# Patient Record
Sex: FEMALE | Race: WHITE | NOT HISPANIC OR LATINO | Employment: FULL TIME
[De-identification: names, ages, dates, MRNs, and addresses within clinical notes are randomized per-mention and may not be internally consistent; named-entity substitution may affect disease eponyms.]

---

## 2017-07-01 ENCOUNTER — HEALTH MAINTENANCE LETTER (OUTPATIENT)
Age: 42
End: 2017-07-01

## 2019-10-04 ENCOUNTER — HEALTH MAINTENANCE LETTER (OUTPATIENT)
Age: 44
End: 2019-10-04

## 2020-11-08 ENCOUNTER — HEALTH MAINTENANCE LETTER (OUTPATIENT)
Age: 45
End: 2020-11-08

## 2021-01-31 ENCOUNTER — HEALTH MAINTENANCE LETTER (OUTPATIENT)
Age: 46
End: 2021-01-31

## 2021-09-11 ENCOUNTER — HEALTH MAINTENANCE LETTER (OUTPATIENT)
Age: 46
End: 2021-09-11

## 2022-01-01 ENCOUNTER — HEALTH MAINTENANCE LETTER (OUTPATIENT)
Age: 47
End: 2022-01-01

## 2022-02-26 ENCOUNTER — HEALTH MAINTENANCE LETTER (OUTPATIENT)
Age: 47
End: 2022-02-26

## 2022-10-30 ENCOUNTER — HEALTH MAINTENANCE LETTER (OUTPATIENT)
Age: 47
End: 2022-10-30

## 2023-04-08 ENCOUNTER — HEALTH MAINTENANCE LETTER (OUTPATIENT)
Age: 48
End: 2023-04-08

## 2024-01-11 ENCOUNTER — OFFICE VISIT (OUTPATIENT)
Dept: SURGERY | Facility: CLINIC | Age: 49
End: 2024-01-11
Payer: COMMERCIAL

## 2024-01-11 VITALS
SYSTOLIC BLOOD PRESSURE: 140 MMHG | HEART RATE: 90 BPM | DIASTOLIC BLOOD PRESSURE: 78 MMHG | WEIGHT: 150 LBS | HEIGHT: 66 IN | BODY MASS INDEX: 24.11 KG/M2

## 2024-01-11 DIAGNOSIS — K43.2 INCISIONAL HERNIA, WITHOUT OBSTRUCTION OR GANGRENE: Primary | ICD-10-CM

## 2024-01-11 PROCEDURE — 99204 OFFICE O/P NEW MOD 45 MIN: CPT | Performed by: SURGERY

## 2024-01-11 RX ORDER — LEVOTHYROXINE SODIUM 175 UG/1
175 TABLET ORAL DAILY
COMMUNITY

## 2024-01-15 ENCOUNTER — TELEPHONE (OUTPATIENT)
Dept: SURGERY | Facility: CLINIC | Age: 49
End: 2024-01-15
Payer: COMMERCIAL

## 2024-01-15 NOTE — TELEPHONE ENCOUNTER
Type of surgery: Abdominal wall reconstruction and open incisional hernia repair  Location of surgery: [facility]  Date and time of surgery: 1/31/24 at 7:30am  Surgeon: Dr. Leonardo Diggs  Pre-Op Appt Date: patient to schedule  Post-Op Appt Date: patient to schedule   Packet sent out: Yes  Pre-cert/Authorization completed:  Not Applicable  Date: 1/15/24

## 2024-01-22 ENCOUNTER — TELEPHONE (OUTPATIENT)
Dept: SURGERY | Facility: CLINIC | Age: 49
End: 2024-01-22
Payer: COMMERCIAL

## 2024-01-22 NOTE — TELEPHONE ENCOUNTER
Name of caller: Patient    Reason for Call:  Medication Question  Patient has issues with BM's. She takes a medication: Linzess to help with the issue. Wondering if she can take this medication prior to her surgery?    Surgeon:  Dr. Diggs     Recent Surgery:  No    If yes, when & what type:  scheduled 1/31/24    ABDOMINAL WALL RECONSTRUCTION AND OPEN INCISIONAL HERNIA REPAIR       Best phone number to reach pt at is: [phone]  Ok to leave a message with medical info? Yes.    .

## 2024-01-22 NOTE — TELEPHONE ENCOUNTER
Planned Procedure: abdominal wall reconstruction, open incisional hernia repair    Date: 01/31/2024    Surgeon: Dontae    Patient takes linzess. Wondering if this is okay to take up until surgery? Informed her that the only medications our providers recommend be held are blood thinners and some immune suppressant medications. She should discuss this with the provider who will be evaluating her/giving her surgical clearance as sometimes PCP's want additional medications held. She agreed    She reports that she will need to be sent home with some type of bowel regimen after surgery (hx of bowel surgery, which seems to have resulted in issues with constipation/bowel habits). Informed her that she will be sent home with sennakot to use while on narcotics. Can also use miralax, linzess, or other medication regimen that she currently adheres to.    Patient is wondering if there is anything else she needs to be aware of prior to surgery? Informed her that someone from the OR staff will call her the day before surgery to review everything on their end of things.    Patient is wondering if they could call her a couple days before, instead? She reports she has a couple appointments the day before and does not want to miss their call    Informed her that I will send a message to their team, but that I can not guarantee anything    She verbalizes understanding of this and will call BIPIN Joyce RN-BSN

## 2024-01-25 NOTE — PROGRESS NOTES
"[facility]  Surgery Consultation    CONSULTATION REQUESTED BY: Christos Ye MD    HPI: Patient is a very pleasant 49-year-old female referred by the above-mentioned provider for consultation regarding diastases recti.  She has a prior surgical history of cholecystectomy and urgent colon resection for cecal volvulus.  She has noted the development of bulging in her central abdomen that seems to be worsening.  No signs or symptoms of incarceration or strangulation.  No GI or bowel obstruction symptoms.  She also has a multitude of other musculoskeletal issues including chronic core insufficiency and chronic low back pain.    PMH:   has a past medical history of Anxiety state, unspecified (8/3/2005), Endometriosis of other specified sites, Graves Disease (10/21/2005), and Irritable bowel syndrome (10/12/2005).  PSH:    has a past surgical history that includes UPPER GI ENDOSCOPY,EXAM; cholecystectomy, laporoscopic; and surgical history of - .  Social History:   reports that she has never smoked. She has never used smokeless tobacco. She reports current alcohol use. She reports that she does not use drugs.  Family History:  family history includes Coronary Artery Disease in her father and mother; Diabetes in her mother; Hypertension in her father.  Medications/Allergies: Home medications and allergies reviewed.    ROS:  The 10 point Review of Systems is negative other than noted in the HPI.    Physical Exam:  BP (!) 140/78   Pulse 90   Ht 1.664 m (5' 5.5\")   Wt 68 kg (150 lb)   BMI 24.58 kg/m    GENERAL: Generally appears well.  Psych: Alert and Oriented.  Normal affect  Eyes: Sclera clear  Respiratory:  Lungs clear to ausculation bilaterally with good air excursion  Cardiovascular:  Regular Rate and Rhythm with no murmurs gallops or rubs, normal peripheral pulses  GI: Abdomen Non Distended Soft Mild tenderness to palpation incision Incisional hernia palpated..  Lymphatic/Hematologic/Immune:  No " femoral or cervical lymphadenopathy.  Integumentary:  No rashes  Neurological: grossly intact     All new lab and imaging data was reviewed.     Impression and Plan:  Patient is a 49 year old female with incisional hernia in the setting of underlying diastases recti.    PLAN: I had a lengthy conversation regarding management options.  We also discussed different surgical approaches.  I think ultimately she would likely be best served by abdominal wall reconstruction to rectify normalize anatomy including the diastases and the hernia.  This procedure was described in detail.  She is going to take this into consideration and was encouraged to call back to schedule at her convenience.  I discussed the pathophysiology of hernias and options for repair including laparoscopic VS open.  The risks associated with the procedure including, but not limited to, recurrence, nerve entrapment or injury, persistence of pain, injury to the bowel/bladder, infertility, hematoma, mesh migration, mesh infection, MI, and PE were discussed with the patient. She indicated understanding of the discussion, asked appropriate questions, and provided consent. Signs and symptoms of incarceration were discussed. If these develop in the interim, she promises to call or go straight to the ER. I have provided the patient with an information pamphlet.  Thank you very much for this consult.    Leonardo Diggs M.D.  [facility]  [phone]    Please route or send letter to:  Primary Care Provider (PCP) and Referring Provider

## 2024-01-29 ENCOUNTER — TELEPHONE (OUTPATIENT)
Dept: SURGERY | Facility: CLINIC | Age: 49
End: 2024-01-29
Payer: COMMERCIAL

## 2024-01-29 RX ORDER — MOMETASONE FUROATE 1 MG/G
OINTMENT TOPICAL 2 TIMES DAILY PRN
COMMUNITY
End: 2024-05-09

## 2024-01-29 RX ORDER — VITAMIN B COMPLEX
2 TABLET ORAL DAILY
COMMUNITY
End: 2024-01-30

## 2024-01-29 RX ORDER — SUMATRIPTAN 100 MG/1
100 TABLET, FILM COATED ORAL
COMMUNITY

## 2024-01-29 RX ORDER — ONDANSETRON 4 MG/1
4 TABLET, FILM COATED ORAL EVERY 8 HOURS PRN
COMMUNITY

## 2024-01-29 NOTE — TELEPHONE ENCOUNTER
Name of caller: Patient    Reason for Call:  patient has some questions about her upcoming surgery she would like to discuss with a nurse.  Wants to know how long surgery takes, is she really outpatient? Same day surgery?    Surgeon:  Leonardo Diggs MD    Recent Surgery:  No    If yes, when & what type:  Scheduled 1/31/24    Abdominal wall reconstruction   open incisional hernia repair     Best phone number to reach pt at is: [phone]  Ok to leave a message with medical info? Yes.

## 2024-01-29 NOTE — TELEPHONE ENCOUNTER
"Called pt to inform her that she will be \"outpatient in a bed\" and will be observed overnight. Surgery scheduled for 3.5 hours.   We discussed showering regimen and not using any creams/lotions/make up afterwards prior to surgery.   Pt verbalized understanding. No further questions or concerns.  Shanna Delgado RN on 1/29/2024 at 1:07 PM    "

## 2024-01-30 RX ORDER — NORETHINDRONE ACETATE AND ETHINYL ESTRADIOL .03; 1.5 MG/1; MG/1
1 TABLET ORAL DAILY
COMMUNITY

## 2024-01-30 NOTE — PROGRESS NOTES
PTA medications updated by Medication Scribe prior to surgery via phone call with patient (last doses completed by Nurse)     Medication history sources: Patient and H&P  In the past week, patient estimated taking medication this percent of the time: Greater than 90%      Significant changes made to the medication list:  Patient reports no longer taking the following meds (med scribe removed from PTA med list): Microgestin 1/20, Vitamin D3      Additional medication history information:   None    Medication reconciliation completed by provider prior to medication history? No    Time spent in this activity: 28 minutes    The information provided in this note is only as accurate as the sources available at the time of update(s)      Prior to Admission medications    Medication Sig Last Dose Taking? Auth Provider Long Term End Date   doxylamine (UNISOM) 25 MG TABS tablet Take 25 mg by mouth at bedtime  at pm Yes Reported, Patient     levothyroxine (SYNTHROID/LEVOTHROID) 175 MCG tablet Take 175 mcg by mouth daily  at am Yes Reported, Patient Yes    linaclotide (LINZESS) 145 MCG capsule Take 145 mcg by mouth every morning (before breakfast) 1/30/2024 at am Yes Reported, Patient     mometasone (ELOCON) 0.1 % external ointment Apply topically 2 times daily as needed Unknown at prn Yes Reported, Patient     norethindrone-ethinyl estradiol (MICROGESTIN 1.5/30) 1.5-30 MG-MCG tablet Take 1 tablet by mouth daily  at am Yes Reported, Patient     ondansetron (ZOFRAN) 4 MG tablet Take 4 mg by mouth every 8 hours as needed for nausea Unknown at prn Yes Reported, Patient     SUMAtriptan (IMITREX) 100 MG tablet Take 100 mg by mouth at onset of headache for migraine Unknown at prn Yes Reported, Patient         Medication history completed by: Oumou Car LPN

## 2024-01-31 ENCOUNTER — HOSPITAL ENCOUNTER (OUTPATIENT)
Facility: CLINIC | Age: 49
Discharge: HOME | End: 2024-02-03
Attending: SURGERY | Admitting: SURGERY
Payer: COMMERCIAL

## 2024-01-31 ENCOUNTER — ANESTHESIA EVENT (OUTPATIENT)
Dept: SURGERY | Facility: CLINIC | Age: 49
End: 2024-01-31
Payer: COMMERCIAL

## 2024-01-31 ENCOUNTER — ANESTHESIA (OUTPATIENT)
Dept: SURGERY | Facility: CLINIC | Age: 49
End: 2024-01-31
Payer: COMMERCIAL

## 2024-01-31 DIAGNOSIS — K43.2 INCISIONAL HERNIA, WITHOUT OBSTRUCTION OR GANGRENE: Primary | ICD-10-CM

## 2024-01-31 LAB — HCG UR QL: NEGATIVE

## 2024-01-31 PROCEDURE — 258N000003 HC RX IP 258 OP 636: Performed by: PHYSICIAN ASSISTANT

## 2024-01-31 PROCEDURE — 250N000009 HC RX 250: Performed by: ANESTHESIOLOGY

## 2024-01-31 PROCEDURE — 710N000009 HC RECOVERY PHASE 1, LEVEL 1, PER MIN: Performed by: SURGERY

## 2024-01-31 PROCEDURE — 250N000011 HC RX IP 250 OP 636: Performed by: ANESTHESIOLOGY

## 2024-01-31 PROCEDURE — 250N000011 HC RX IP 250 OP 636: Performed by: SURGERY

## 2024-01-31 PROCEDURE — 360N000076 HC SURGERY LEVEL 3, PER MIN: Performed by: SURGERY

## 2024-01-31 PROCEDURE — 272N000001 HC OR GENERAL SUPPLY STERILE: Performed by: SURGERY

## 2024-01-31 PROCEDURE — 250N000011 HC RX IP 250 OP 636: Performed by: PHYSICIAN ASSISTANT

## 2024-01-31 PROCEDURE — 15734 MUSCLE-SKIN GRAFT TRUNK: CPT | Performed by: SURGERY

## 2024-01-31 PROCEDURE — C1781 MESH (IMPLANTABLE): HCPCS | Performed by: SURGERY

## 2024-01-31 PROCEDURE — 250N000013 HC RX MED GY IP 250 OP 250 PS 637: Performed by: SURGERY

## 2024-01-31 PROCEDURE — 49591 RPR AA HRN 1ST < 3 CM RDC: CPT | Mod: 51 | Performed by: SURGERY

## 2024-01-31 PROCEDURE — 250N000013 HC RX MED GY IP 250 OP 250 PS 637: Performed by: ANESTHESIOLOGY

## 2024-01-31 PROCEDURE — 258N000003 HC RX IP 258 OP 636: Performed by: ANESTHESIOLOGY

## 2024-01-31 PROCEDURE — 49591 RPR AA HRN 1ST < 3 CM RDC: CPT | Mod: 51 | Performed by: PHYSICIAN ASSISTANT

## 2024-01-31 PROCEDURE — 999N000141 HC STATISTIC PRE-PROCEDURE NURSING ASSESSMENT: Performed by: SURGERY

## 2024-01-31 PROCEDURE — 250N000009 HC RX 250: Performed by: SURGERY

## 2024-01-31 PROCEDURE — 370N000017 HC ANESTHESIA TECHNICAL FEE, PER MIN: Performed by: SURGERY

## 2024-01-31 PROCEDURE — 15734 MUSCLE-SKIN GRAFT TRUNK: CPT | Mod: 51 | Performed by: PHYSICIAN ASSISTANT

## 2024-01-31 PROCEDURE — 81025 URINE PREGNANCY TEST: CPT | Performed by: ANESTHESIOLOGY

## 2024-01-31 PROCEDURE — 250N000013 HC RX MED GY IP 250 OP 250 PS 637: Performed by: PHYSICIAN ASSISTANT

## 2024-01-31 DEVICE — COMPOSITE MESH,MONOFILAMENT POLYESTER WITH ABSORBABLE COLLAGEN FILM AND MARKING
Type: IMPLANTABLE DEVICE | Site: ABDOMEN | Status: FUNCTIONAL
Brand: SYMBOTEX

## 2024-01-31 RX ORDER — ONDANSETRON 4 MG/1
4 TABLET, ORALLY DISINTEGRATING ORAL EVERY 6 HOURS PRN
Status: DISCONTINUED | OUTPATIENT
Start: 2024-01-31 | End: 2024-01-31

## 2024-01-31 RX ORDER — KETOROLAC TROMETHAMINE 30 MG/ML
30 INJECTION, SOLUTION INTRAMUSCULAR; INTRAVENOUS ONCE
Status: COMPLETED | OUTPATIENT
Start: 2024-01-31 | End: 2024-01-31

## 2024-01-31 RX ORDER — LIDOCAINE 40 MG/G
CREAM TOPICAL
Status: DISCONTINUED | OUTPATIENT
Start: 2024-01-31 | End: 2024-02-03 | Stop reason: HOSPADM

## 2024-01-31 RX ORDER — PROPOFOL 10 MG/ML
INJECTION, EMULSION INTRAVENOUS CONTINUOUS PRN
Status: DISCONTINUED | OUTPATIENT
Start: 2024-01-31 | End: 2024-01-31

## 2024-01-31 RX ORDER — LIDOCAINE HYDROCHLORIDE 20 MG/ML
INJECTION, SOLUTION INFILTRATION; PERINEURAL PRN
Status: DISCONTINUED | OUTPATIENT
Start: 2024-01-31 | End: 2024-01-31

## 2024-01-31 RX ORDER — SODIUM CHLORIDE, SODIUM LACTATE, POTASSIUM CHLORIDE, CALCIUM CHLORIDE 600; 310; 30; 20 MG/100ML; MG/100ML; MG/100ML; MG/100ML
INJECTION, SOLUTION INTRAVENOUS CONTINUOUS
Status: DISCONTINUED | OUTPATIENT
Start: 2024-01-31 | End: 2024-02-03 | Stop reason: HOSPADM

## 2024-01-31 RX ORDER — PROPOFOL 10 MG/ML
INJECTION, EMULSION INTRAVENOUS PRN
Status: DISCONTINUED | OUTPATIENT
Start: 2024-01-31 | End: 2024-01-31

## 2024-01-31 RX ORDER — KETOROLAC TROMETHAMINE 15 MG/ML
15 INJECTION, SOLUTION INTRAMUSCULAR; INTRAVENOUS EVERY 6 HOURS
Qty: 4 ML | Refills: 0 | Status: COMPLETED | OUTPATIENT
Start: 2024-01-31 | End: 2024-02-01

## 2024-01-31 RX ORDER — METHOCARBAMOL 500 MG/1
500 TABLET, FILM COATED ORAL 4 TIMES DAILY
Status: DISCONTINUED | OUTPATIENT
Start: 2024-01-31 | End: 2024-02-03 | Stop reason: HOSPADM

## 2024-01-31 RX ORDER — ONDANSETRON 4 MG/1
4 TABLET, ORALLY DISINTEGRATING ORAL EVERY 30 MIN PRN
Status: DISCONTINUED | OUTPATIENT
Start: 2024-01-31 | End: 2024-01-31

## 2024-01-31 RX ORDER — FENTANYL CITRATE 50 UG/ML
INJECTION, SOLUTION INTRAMUSCULAR; INTRAVENOUS PRN
Status: DISCONTINUED | OUTPATIENT
Start: 2024-01-31 | End: 2024-01-31

## 2024-01-31 RX ORDER — SCOLOPAMINE TRANSDERMAL SYSTEM 1 MG/1
1 PATCH, EXTENDED RELEASE TRANSDERMAL ONCE
Status: COMPLETED | OUTPATIENT
Start: 2024-01-31 | End: 2024-02-01

## 2024-01-31 RX ORDER — ONDANSETRON 2 MG/ML
4 INJECTION INTRAMUSCULAR; INTRAVENOUS EVERY 6 HOURS PRN
Status: DISCONTINUED | OUTPATIENT
Start: 2024-01-31 | End: 2024-01-31

## 2024-01-31 RX ORDER — HYDROMORPHONE HCL IN WATER/PF 6 MG/30 ML
0.4 PATIENT CONTROLLED ANALGESIA SYRINGE INTRAVENOUS
Status: DISCONTINUED | OUTPATIENT
Start: 2024-01-31 | End: 2024-02-03 | Stop reason: HOSPADM

## 2024-01-31 RX ORDER — PROCHLORPERAZINE MALEATE 10 MG
10 TABLET ORAL EVERY 6 HOURS PRN
Status: DISCONTINUED | OUTPATIENT
Start: 2024-01-31 | End: 2024-02-03 | Stop reason: HOSPADM

## 2024-01-31 RX ORDER — OXYCODONE HYDROCHLORIDE 5 MG/1
5 TABLET ORAL EVERY 4 HOURS PRN
Status: DISCONTINUED | OUTPATIENT
Start: 2024-01-31 | End: 2024-02-03 | Stop reason: HOSPADM

## 2024-01-31 RX ORDER — HYDROMORPHONE HCL IN WATER/PF 6 MG/30 ML
0.2 PATIENT CONTROLLED ANALGESIA SYRINGE INTRAVENOUS EVERY 5 MIN PRN
Status: DISCONTINUED | OUTPATIENT
Start: 2024-01-31 | End: 2024-01-31

## 2024-01-31 RX ORDER — CEFAZOLIN SODIUM/WATER 2 G/20 ML
2 SYRINGE (ML) INTRAVENOUS SEE ADMIN INSTRUCTIONS
Status: DISCONTINUED | OUTPATIENT
Start: 2024-01-31 | End: 2024-01-31 | Stop reason: HOSPADM

## 2024-01-31 RX ORDER — SODIUM CHLORIDE, SODIUM LACTATE, POTASSIUM CHLORIDE, CALCIUM CHLORIDE 600; 310; 30; 20 MG/100ML; MG/100ML; MG/100ML; MG/100ML
INJECTION, SOLUTION INTRAVENOUS CONTINUOUS PRN
Status: DISCONTINUED | OUTPATIENT
Start: 2024-01-31 | End: 2024-01-31

## 2024-01-31 RX ORDER — NALOXONE HYDROCHLORIDE 0.4 MG/ML
0.4 INJECTION, SOLUTION INTRAMUSCULAR; INTRAVENOUS; SUBCUTANEOUS
Status: DISCONTINUED | OUTPATIENT
Start: 2024-01-31 | End: 2024-02-03 | Stop reason: HOSPADM

## 2024-01-31 RX ORDER — AMOXICILLIN 250 MG
1 CAPSULE ORAL 2 TIMES DAILY
Status: DISCONTINUED | OUTPATIENT
Start: 2024-01-31 | End: 2024-02-03 | Stop reason: HOSPADM

## 2024-01-31 RX ORDER — NALOXONE HYDROCHLORIDE 0.4 MG/ML
0.2 INJECTION, SOLUTION INTRAMUSCULAR; INTRAVENOUS; SUBCUTANEOUS
Status: DISCONTINUED | OUTPATIENT
Start: 2024-01-31 | End: 2024-02-03 | Stop reason: HOSPADM

## 2024-01-31 RX ORDER — OXYCODONE HYDROCHLORIDE 5 MG/1
10 TABLET ORAL EVERY 4 HOURS PRN
Status: DISCONTINUED | OUTPATIENT
Start: 2024-01-31 | End: 2024-02-03 | Stop reason: HOSPADM

## 2024-01-31 RX ORDER — SODIUM CHLORIDE, SODIUM LACTATE, POTASSIUM CHLORIDE, CALCIUM CHLORIDE 600; 310; 30; 20 MG/100ML; MG/100ML; MG/100ML; MG/100ML
INJECTION, SOLUTION INTRAVENOUS CONTINUOUS
Status: DISCONTINUED | OUTPATIENT
Start: 2024-01-31 | End: 2024-01-31

## 2024-01-31 RX ORDER — ONDANSETRON 2 MG/ML
4 INJECTION INTRAMUSCULAR; INTRAVENOUS EVERY 30 MIN PRN
Status: DISCONTINUED | OUTPATIENT
Start: 2024-01-31 | End: 2024-01-31

## 2024-01-31 RX ORDER — HYDROMORPHONE HCL IN WATER/PF 6 MG/30 ML
0.2 PATIENT CONTROLLED ANALGESIA SYRINGE INTRAVENOUS
Status: DISCONTINUED | OUTPATIENT
Start: 2024-01-31 | End: 2024-02-03 | Stop reason: HOSPADM

## 2024-01-31 RX ORDER — DEXMEDETOMIDINE HYDROCHLORIDE 4 UG/ML
INJECTION, SOLUTION INTRAVENOUS CONTINUOUS PRN
Status: DISCONTINUED | OUTPATIENT
Start: 2024-01-31 | End: 2024-01-31

## 2024-01-31 RX ORDER — DEXAMETHASONE SODIUM PHOSPHATE 4 MG/ML
INJECTION, SOLUTION INTRA-ARTICULAR; INTRALESIONAL; INTRAMUSCULAR; INTRAVENOUS; SOFT TISSUE PRN
Status: DISCONTINUED | OUTPATIENT
Start: 2024-01-31 | End: 2024-01-31

## 2024-01-31 RX ORDER — SUMATRIPTAN 50 MG/1
100 TABLET, FILM COATED ORAL
Status: DISCONTINUED | OUTPATIENT
Start: 2024-01-31 | End: 2024-02-03 | Stop reason: HOSPADM

## 2024-01-31 RX ORDER — KETAMINE HYDROCHLORIDE 10 MG/ML
INJECTION INTRAMUSCULAR; INTRAVENOUS PRN
Status: DISCONTINUED | OUTPATIENT
Start: 2024-01-31 | End: 2024-01-31

## 2024-01-31 RX ORDER — NORETHINDRONE ACETATE AND ETHINYL ESTRADIOL .03; 1.5 MG/1; MG/1
1 TABLET ORAL DAILY
Status: DISCONTINUED | OUTPATIENT
Start: 2024-02-01 | End: 2024-02-03 | Stop reason: HOSPADM

## 2024-01-31 RX ORDER — MAGNESIUM HYDROXIDE 1200 MG/15ML
LIQUID ORAL PRN
Status: DISCONTINUED | OUTPATIENT
Start: 2024-01-31 | End: 2024-01-31 | Stop reason: HOSPADM

## 2024-01-31 RX ORDER — ONDANSETRON 4 MG/1
4 TABLET, FILM COATED ORAL EVERY 8 HOURS PRN
Status: DISCONTINUED | OUTPATIENT
Start: 2024-01-31 | End: 2024-01-31

## 2024-01-31 RX ORDER — CEFAZOLIN SODIUM/WATER 2 G/20 ML
2 SYRINGE (ML) INTRAVENOUS
Status: COMPLETED | OUTPATIENT
Start: 2024-01-31 | End: 2024-01-31

## 2024-01-31 RX ORDER — ACETAMINOPHEN 325 MG/1
975 TABLET ORAL ONCE
Status: COMPLETED | OUTPATIENT
Start: 2024-01-31 | End: 2024-01-31

## 2024-01-31 RX ORDER — NEOSTIGMINE METHYLSULFATE 1 MG/ML
VIAL (ML) INJECTION PRN
Status: DISCONTINUED | OUTPATIENT
Start: 2024-01-31 | End: 2024-01-31

## 2024-01-31 RX ORDER — ONDANSETRON 2 MG/ML
4 INJECTION INTRAMUSCULAR; INTRAVENOUS EVERY 6 HOURS PRN
Status: DISCONTINUED | OUTPATIENT
Start: 2024-01-31 | End: 2024-02-03 | Stop reason: HOSPADM

## 2024-01-31 RX ORDER — ONDANSETRON 4 MG/1
4 TABLET, ORALLY DISINTEGRATING ORAL EVERY 6 HOURS PRN
Status: DISCONTINUED | OUTPATIENT
Start: 2024-01-31 | End: 2024-02-03 | Stop reason: HOSPADM

## 2024-01-31 RX ORDER — HYDROMORPHONE HCL IN WATER/PF 6 MG/30 ML
0.4 PATIENT CONTROLLED ANALGESIA SYRINGE INTRAVENOUS EVERY 5 MIN PRN
Status: DISCONTINUED | OUTPATIENT
Start: 2024-01-31 | End: 2024-01-31

## 2024-01-31 RX ORDER — LEVOTHYROXINE SODIUM 175 UG/1
175 TABLET ORAL DAILY
Status: DISCONTINUED | OUTPATIENT
Start: 2024-02-01 | End: 2024-02-03 | Stop reason: HOSPADM

## 2024-01-31 RX ORDER — ONDANSETRON 2 MG/ML
INJECTION INTRAMUSCULAR; INTRAVENOUS PRN
Status: DISCONTINUED | OUTPATIENT
Start: 2024-01-31 | End: 2024-01-31

## 2024-01-31 RX ORDER — FENTANYL CITRATE 50 UG/ML
50 INJECTION, SOLUTION INTRAMUSCULAR; INTRAVENOUS EVERY 5 MIN PRN
Status: DISCONTINUED | OUTPATIENT
Start: 2024-01-31 | End: 2024-01-31

## 2024-01-31 RX ORDER — GLYCOPYRROLATE 0.2 MG/ML
INJECTION, SOLUTION INTRAMUSCULAR; INTRAVENOUS PRN
Status: DISCONTINUED | OUTPATIENT
Start: 2024-01-31 | End: 2024-01-31

## 2024-01-31 RX ORDER — BUPIVACAINE HYDROCHLORIDE 5 MG/ML
INJECTION, SOLUTION PERINEURAL PRN
Status: DISCONTINUED | OUTPATIENT
Start: 2024-01-31 | End: 2024-01-31 | Stop reason: HOSPADM

## 2024-01-31 RX ORDER — FENTANYL CITRATE 50 UG/ML
25 INJECTION, SOLUTION INTRAMUSCULAR; INTRAVENOUS EVERY 5 MIN PRN
Status: DISCONTINUED | OUTPATIENT
Start: 2024-01-31 | End: 2024-01-31

## 2024-01-31 RX ORDER — ACETAMINOPHEN 325 MG/1
975 TABLET ORAL EVERY 8 HOURS
Status: DISCONTINUED | OUTPATIENT
Start: 2024-01-31 | End: 2024-02-03 | Stop reason: HOSPADM

## 2024-01-31 RX ADMIN — FENTANYL CITRATE 50 MCG: 50 INJECTION INTRAMUSCULAR; INTRAVENOUS at 07:37

## 2024-01-31 RX ADMIN — DEXAMETHASONE SODIUM PHOSPHATE 8 MG: 4 INJECTION, SOLUTION INTRA-ARTICULAR; INTRALESIONAL; INTRAMUSCULAR; INTRAVENOUS; SOFT TISSUE at 07:37

## 2024-01-31 RX ADMIN — ROCURONIUM BROMIDE 10 MG: 50 INJECTION, SOLUTION INTRAVENOUS at 07:50

## 2024-01-31 RX ADMIN — HYDROMORPHONE HYDROCHLORIDE 0.5 MG: 1 INJECTION, SOLUTION INTRAMUSCULAR; INTRAVENOUS; SUBCUTANEOUS at 09:16

## 2024-01-31 RX ADMIN — PROPOFOL 200 MG: 10 INJECTION, EMULSION INTRAVENOUS at 07:37

## 2024-01-31 RX ADMIN — Medication 25 MG: at 07:56

## 2024-01-31 RX ADMIN — METHOCARBAMOL 500 MG: 500 TABLET ORAL at 15:48

## 2024-01-31 RX ADMIN — KETOROLAC TROMETHAMINE 15 MG: 15 INJECTION, SOLUTION INTRAMUSCULAR; INTRAVENOUS at 12:56

## 2024-01-31 RX ADMIN — NEOSTIGMINE METHYLSULFATE 3.5 MG: 1 INJECTION, SOLUTION INTRAVENOUS at 08:46

## 2024-01-31 RX ADMIN — FENTANYL CITRATE 50 MCG: 50 INJECTION, SOLUTION INTRAMUSCULAR; INTRAVENOUS at 09:57

## 2024-01-31 RX ADMIN — ACETAMINOPHEN 975 MG: 325 TABLET, FILM COATED ORAL at 06:58

## 2024-01-31 RX ADMIN — Medication 2 G: at 07:34

## 2024-01-31 RX ADMIN — ACETAMINOPHEN 975 MG: 325 TABLET, FILM COATED ORAL at 14:55

## 2024-01-31 RX ADMIN — HYDROMORPHONE HYDROCHLORIDE 0.5 MG: 1 INJECTION, SOLUTION INTRAMUSCULAR; INTRAVENOUS; SUBCUTANEOUS at 08:07

## 2024-01-31 RX ADMIN — KETOROLAC TROMETHAMINE 30 MG: 30 INJECTION, SOLUTION INTRAMUSCULAR; INTRAVENOUS at 10:15

## 2024-01-31 RX ADMIN — OXYCODONE HYDROCHLORIDE 10 MG: 5 TABLET ORAL at 21:28

## 2024-01-31 RX ADMIN — GLYCOPYRROLATE 0.7 MG: 0.2 INJECTION, SOLUTION INTRAMUSCULAR; INTRAVENOUS at 08:46

## 2024-01-31 RX ADMIN — ONDANSETRON 4 MG: 2 INJECTION INTRAMUSCULAR; INTRAVENOUS at 15:02

## 2024-01-31 RX ADMIN — SCOPALAMINE 1 PATCH: 1 PATCH, EXTENDED RELEASE TRANSDERMAL at 06:58

## 2024-01-31 RX ADMIN — DEXMEDETOMIDINE HYDROCHLORIDE 0.5 MCG/KG/HR: 200 INJECTION INTRAVENOUS at 07:45

## 2024-01-31 RX ADMIN — METHOCARBAMOL 500 MG: 500 TABLET ORAL at 19:45

## 2024-01-31 RX ADMIN — FENTANYL CITRATE 50 MCG: 50 INJECTION INTRAMUSCULAR; INTRAVENOUS at 07:50

## 2024-01-31 RX ADMIN — SODIUM CHLORIDE, POTASSIUM CHLORIDE, SODIUM LACTATE AND CALCIUM CHLORIDE: 600; 310; 30; 20 INJECTION, SOLUTION INTRAVENOUS at 09:26

## 2024-01-31 RX ADMIN — HYDROMORPHONE HYDROCHLORIDE 0.2 MG: 0.2 INJECTION, SOLUTION INTRAMUSCULAR; INTRAVENOUS; SUBCUTANEOUS at 14:57

## 2024-01-31 RX ADMIN — Medication 15 MG: at 08:33

## 2024-01-31 RX ADMIN — LIDOCAINE HYDROCHLORIDE 100 MG: 20 INJECTION, SOLUTION INFILTRATION; PERINEURAL at 07:37

## 2024-01-31 RX ADMIN — SENNOSIDES AND DOCUSATE SODIUM 1 TABLET: 50; 8.6 TABLET ORAL at 19:45

## 2024-01-31 RX ADMIN — ONDANSETRON 4 MG: 2 INJECTION INTRAMUSCULAR; INTRAVENOUS at 07:37

## 2024-01-31 RX ADMIN — OXYCODONE HYDROCHLORIDE 5 MG: 5 TABLET ORAL at 17:34

## 2024-01-31 RX ADMIN — SODIUM CHLORIDE, POTASSIUM CHLORIDE, SODIUM LACTATE AND CALCIUM CHLORIDE: 600; 310; 30; 20 INJECTION, SOLUTION INTRAVENOUS at 12:55

## 2024-01-31 RX ADMIN — SODIUM CHLORIDE, POTASSIUM CHLORIDE, SODIUM LACTATE AND CALCIUM CHLORIDE: 600; 310; 30; 20 INJECTION, SOLUTION INTRAVENOUS at 21:48

## 2024-01-31 RX ADMIN — KETOROLAC TROMETHAMINE 15 MG: 15 INJECTION, SOLUTION INTRAMUSCULAR; INTRAVENOUS at 18:59

## 2024-01-31 RX ADMIN — FENTANYL CITRATE 50 MCG: 50 INJECTION, SOLUTION INTRAMUSCULAR; INTRAVENOUS at 10:45

## 2024-01-31 RX ADMIN — Medication 10 MG: at 08:56

## 2024-01-31 RX ADMIN — DOXYLAMINE SUCCINATE 25 MG: 25 TABLET ORAL at 23:30

## 2024-01-31 RX ADMIN — PROPOFOL 150 MCG/KG/MIN: 10 INJECTION, EMULSION INTRAVENOUS at 07:41

## 2024-01-31 RX ADMIN — ONDANSETRON 4 MG: 4 TABLET, ORALLY DISINTEGRATING ORAL at 21:59

## 2024-01-31 RX ADMIN — ACETAMINOPHEN 975 MG: 325 TABLET, FILM COATED ORAL at 21:29

## 2024-01-31 RX ADMIN — SODIUM CHLORIDE, POTASSIUM CHLORIDE, SODIUM LACTATE AND CALCIUM CHLORIDE: 600; 310; 30; 20 INJECTION, SOLUTION INTRAVENOUS at 07:32

## 2024-01-31 RX ADMIN — ONDANSETRON 4 MG: 2 INJECTION INTRAMUSCULAR; INTRAVENOUS at 09:16

## 2024-01-31 RX ADMIN — ROCURONIUM BROMIDE 40 MG: 50 INJECTION, SOLUTION INTRAVENOUS at 07:37

## 2024-01-31 RX ADMIN — METHOCARBAMOL 500 MG: 500 TABLET ORAL at 12:56

## 2024-01-31 RX ADMIN — HYDROMORPHONE HYDROCHLORIDE 0.4 MG: 0.2 INJECTION, SOLUTION INTRAMUSCULAR; INTRAVENOUS; SUBCUTANEOUS at 19:52

## 2024-01-31 ASSESSMENT — ACTIVITIES OF DAILY LIVING (ADL)
ADLS_ACUITY_SCORE: 22
ADLS_ACUITY_SCORE: 20
ADLS_ACUITY_SCORE: 22
ADLS_ACUITY_SCORE: 20
ADLS_ACUITY_SCORE: 20

## 2024-01-31 ASSESSMENT — COPD QUESTIONNAIRES: COPD: 0

## 2024-01-31 ASSESSMENT — ENCOUNTER SYMPTOMS: SEIZURES: 0

## 2024-01-31 ASSESSMENT — LIFESTYLE VARIABLES: TOBACCO_USE: 0

## 2024-01-31 NOTE — OR NURSING
Transfer criteria met.  Order received per Dr. Rivera to transfer to Surgical Nursing Care Unit for continued recovery.  Hand-off report given to RN.  To 2201-1 per cart with all belongings.  Will transport with Capnography monitoring.  , Albert notified of transfer.

## 2024-01-31 NOTE — OP NOTE
Preoperative diagnosis: Incisional hernia in the setting of diastases recti    Postoperative diagnosis: Same    Procedure: Abdominal wall reconstruction, open retrorectus incisional hernia repair    Surgeon: Leonardo Diggs MD    Assistant: Beto Agarwal PA-C, Physician assistant first assistant was necessary during this procedure due to expertise in patient positioning, prepping, incisional opening, retraction, exposure, and suctioning.    Anesthesia: GEN endotracheal    Estimated blood loss: 10 cc    Specimens: None    Drains: 15 round Leonidas-Milner drain within the subcutaneous space anterior to the fascia    Preoperative medications: Ancef 2 g IV    Indication for procedure: This is a 49-year-old female who previously underwent emergent laparotomy via an upper midline incision for cecal volvulus.  She has unfortunately developed incisional hernia and what was likely a preoperative diastases recti.  This is led to significant core insufficiency and chronic low back pain as well as other symptoms associated with the hernia itself.  We had an exhaustive discussion regarding her management options.  It was elected to proceed with open abdominal wall reconstruction.  The potential risks including but not limited to bleeding, infection, visceral injury, prosthetic infection, chronic pain, recurrent hernia were all reviewed in detail.  Her questions were answered and she consented to proceed.    Procedure: After informed consent was obtained the patient was taken to the operating room and placed supine in the operating room table.  Following the induction of adequate general endotracheal anesthesia, the abdomen was prepped and draped in usual manner.  A surgeon initiated timeout was completed.  Perioperative pharmacal prophylaxis was administered.  A vertical midline incision was made along her old scar in the upper abdomen.  This was carried down through the subcutaneous tissues using electrocautery.  Blunt dissection  into the subcutaneous tissues multiple hernia sacs were encountered along the midline.  Basically the entire upper midline in the area of the previous incision was replaced with multiple hernias.  The hernia sacs were all individually mobilized and dissected back to the level of the fascial margins.  The total area of hernia/diastases was 5 cm transversely and extended 10 cm craniocaudad.  The subcutaneous tissues and skin were mobilized from the fascia circumferentially around the upper abdomen.  During the performance of this dissection we also encountered a 1 cm umbilical hernia.  The umbilical skin was mobilized from the underlying sac and defect.  Once this dissection was completed I incised along the medial border of the rectus abdominis bilaterally and gained access to the retrorectus space.  This was carried well down below the umbilicus as well as up to the level of the xiphoid process.  This was also carried far lateral to the lateral margins of the rectus abdominis.  This created a space that was ultimately will be on the margins of the diastases in the previous hernia.  The posterior fascia was then imbricated with a running 0 Vicryl suture.  There were no obvious or visible defects within the posterior fascia and peritoneum.  Within the retrorectus space created I utilized a 20 cm craniocaudad Symbotex mesh that had been trimmed into a large oval.  At the central most portion of this mesh it was 11 cm transversely.  At the inferior margin it was 7 cm transverse and at the superior margin it was 6 cm transversely.  This fit nicely within the retrorectus space.  The anterior rectus fascia was then approximated with a running looped 0 Maxon suture.  I then further mobilized the skin and subcutaneous tissues.  There was some redundancy to the upper abdominal wall skin along the midline and this excised.  I placed a 15 round Leonidas-Milner drain in the subcutaneous space via a stab incision in the right  lateral abdomen.  The umbilical skin was then sutured back to the fascia using 3-0 Vicryl suture.  The deep subcutaneous layer was approximated with 3-0 Vicryl suture.  The skin incision was closed with a running subcuticular 4-0 Monocryl suture.  Steri-Strips and dressings were applied.  Counts were correct.  This was tolerated without difficulty.  The patient was awakened in the operating room, extubated and taken to recovery stable condition.    Leonardo Diggs MD

## 2024-01-31 NOTE — PLAN OF CARE
POD 0 from a Abdominal wall reconstruction, open incisional hernia repair. A&Ox4. CMS intact. Bowel sounds hypoactive, - flatus, tolerating  clear diet. VSS on 1L NC. Dressing CDI to midline incision. MAYCO in place. Up with A1 GB. C/o mild pain, decreased with dilaudid, tylenol, and toradol. Pt scoring green on the Aggression Stop Light Tool. Plan to continue current care. Discharge pending improvement.

## 2024-01-31 NOTE — ANESTHESIA PROCEDURE NOTES
Airway       Patient location during procedure: OR       Procedure Start/Stop Times: 1/31/2024 7:39 AM  Staff -        Anesthesiologist:  Clinton Rivera MD       CRNA: Hannah Reese       Performed By: CRNA  Consent for Airway        Urgency: elective  Indications and Patient Condition       Indications for airway management: ector-procedural       Induction type:intravenous       Mask difficulty assessment: 1 - vent by mask    Final Airway Details       Final airway type: endotracheal airway       Successful airway: ETT - single and Oral  Endotracheal Airway Details        ETT size (mm): 7.0       Cuffed: yes       Successful intubation technique: direct laryngoscopy       DL Blade Type: MAC 3       Grade View of Cords: 1       Adjucts: stylet       Position: Right       Measured from: gums/teeth       Secured at (cm): 20       Bite block used: None    Post intubation assessment        Placement verified by: capnometry, equal breath sounds and chest rise        Number of attempts at approach: 1       Number of other approaches attempted: 0       Secured with: tape       Ease of procedure: easy       Dentition: Intact and Unchanged    Medication(s) Administered   Medication Administration Time: 1/31/2024 7:39 AM

## 2024-01-31 NOTE — ANESTHESIA CARE TRANSFER NOTE
Patient: Gaby Rivas    Procedure: Procedure(s):  Abdominal wall reconstruction  open incisional hernia repair       Diagnosis: Incisional hernia, without obstruction or gangrene [K43.2]  Diagnosis Additional Information: No value filed.    Anesthesia Type:   General     Note:    Oropharynx: oropharynx clear of all foreign objects and spontaneously breathing  Level of Consciousness: awake  Oxygen Supplementation: room air    Independent Airway: airway patency satisfactory and stable  Dentition: dentition unchanged  Vital Signs Stable: post-procedure vital signs reviewed and stable  Report to RN Given: handoff report given  Patient transferred to: PACU  Comments: Neuromuscular blockade reversed after TOF 3/4, spontaneous respirations, adequate tidal volumes, followed commands to voice, oropharynx suctioned with soft flexible catheter, extubated atraumatically, extubated with suction, airway patent after extubation.  Oxygen via room air at room air to PACU. SpO2, NiBP, and EKG monitors and alarms on and functioning, Juliet Hugger warmer connected to patient gown, report on patient's clinical status given to PACU RN, RN questions answered.         Handoff Report: Identifed the Patient, Identified the Reponsible Provider, Reviewed the pertinent medical history, Discussed the surgical course, Reviewed Intra-OP anesthesia mangement and issues during anesthesia, Set expectations for post-procedure period and Allowed opportunity for questions and acknowledgement of understanding      Vitals:  Vitals Value Taken Time   /70 01/31/24 1115   Temp 37.4  C (99.4  F) 01/31/24 1100   Pulse 60 01/31/24 1120   Resp 10 01/31/24 1120   SpO2 94 % 01/31/24 1120   Vitals shown include unfiled device data.    Electronically Signed By: Hannah Reese  January 31, 2024  11:36 AM

## 2024-01-31 NOTE — ANESTHESIA POSTPROCEDURE EVALUATION
Patient: Gaby Rivas    Procedure: Procedure(s):  Abdominal wall reconstruction  open incisional hernia repair       Anesthesia Type:  General    Note:  Disposition: Inpatient   Postop Pain Control: Uneventful            Sign Out: Well controlled pain   PONV:    Neuro/Psych: Uneventful            Sign Out: Acceptable/Baseline neuro status   Airway/Respiratory: Uneventful            Sign Out: Acceptable/Baseline resp. status   CV/Hemodynamics: Uneventful            Sign Out: Acceptable CV status   Other NRE: NONE   DID A NON-ROUTINE EVENT OCCUR? No           Last vitals:  Vitals Value Taken Time   /70 01/31/24 1115   Temp 37.4  C (99.4  F) 01/31/24 1100   Pulse 60 01/31/24 1120   Resp 10 01/31/24 1120   SpO2 94 % 01/31/24 1120   Vitals shown include unfiled device data.    Electronically Signed By: Clinton Rivera MD  January 31, 2024  11:35 AM

## 2024-01-31 NOTE — ANESTHESIA PREPROCEDURE EVALUATION
"Anesthesia Pre-Procedure Evaluation    Patient: Gaby Rivas   MRN: [id number] : 1975        Procedure : Procedure(s):  Abdominal wall reconstruction  open incisional hernia repair          Past Medical History:   Diagnosis Date    Anxiety state, unspecified 2005    generalized    Cholelithiasis     Endometriosis of other specified sites     Graves Disease 10/21/2005    f/b ENDO    Hypercholesterolemia     Hypocalcemia     Irritable bowel syndrome 10/12/2005    Irritable bowel syndrome     Keratoconjunctivitis sicca not specified as Sjogren's     Migraine with aura and without status migrainosus, not intractable     PONV (postoperative nausea and vomiting)     Postsurgical hypothyroidism     Vitamin D deficiency       Past Surgical History:   Procedure Laterality Date    cecal volvulus      CHOLECYSTECTOMY, LAPOROSCOPIC      Cholecystectomy, Laparoscopic    COLECTOMY RIGHT      HERNIA REPAIR      HYSTEROSCOPY,ABLATION ENDOMETRIUM      SURGICAL HISTORY OF -       laparoscopy x 2 for endometriosis    THYROIDECTOMY       ZZHC UGI ENDOSCOPY, SIMPLE EXAM      x2      No Known Allergies   Social History     Tobacco Use    Smoking status: Never    Smokeless tobacco: Never   Substance Use Topics    Alcohol use: Yes     Comment: Very Rare      Wt Readings from Last 1 Encounters:   24 68 kg (150 lb)        Anesthesia Evaluation        History of anesthetic complications  - PONV.      ROS/MED HX  ENT/Pulmonary:    (-) tobacco use, asthma and COPD   Neurologic:     (+)      migraines,                       (-) no seizures and no CVA   Cardiovascular: Comment: Dr Robin Mckinley Re EKG:    \"ECG done today and interpreted by me, showed normal sinus rhythm, poor R wave progression through lead V3, computer reads possible anterior infarct, age undetermined  ECG done in  showed normal V3 but some ST-T changes in the lateral leads.     Reviewed both ECGs with cardiologistselene consult today. Would " "recommend getting an echocardiogram but does not need to get this done before the surgery since she has an excellent exercise tolerance and is asymptomatic from a cardiac standpoint.\"       (+) Dyslipidemia - -   -  - -                                 Previous cardiac testing   Echo: Date: Results:    Stress Test:  Date: Results:    ECG Reviewed:  Date: 1/24/24 Results:  Poor R wave progression. See above.   Cath:  Date: Results:      METS/Exercise Tolerance: >4 METS    Hematologic:       Musculoskeletal:       GI/Hepatic: Comment: R colectomy for cecal volvulus.   Rectus diastasis.        Renal/Genitourinary:  - neg Renal ROS     Endo:     (+)          thyroid problem, hypothyroidism hyperthyroidism Graves with surgical hypo.,           Psychiatric/Substance Use:     (+) psychiatric history anxiety       Infectious Disease:       Malignancy:    (-) malignancy   Other:            Physical Exam    Airway        Mallampati: II   TM distance: > 3 FB   Neck ROM: full   Mouth opening: > 3 cm    Respiratory Devices and Support         Dental       (+) Minor Abnormalities - some fillings, tiny chips      Cardiovascular   cardiovascular exam normal          Pulmonary   pulmonary exam normal                OUTSIDE LABS:  CBC:   Lab Results   Component Value Date    WBC 5.6 01/19/2009    WBC 6.6 04/21/2008    HGB 13.7 01/19/2009    HGB 13.3 08/11/2008    HCT 41.2 01/19/2009    HCT 39.3 04/21/2008     01/19/2009     04/21/2008     BMP:   Lab Results   Component Value Date     02/25/2008     01/05/2007    POTASSIUM 4.1 02/25/2008    POTASSIUM 4.1 01/05/2007    CHLORIDE 109 02/25/2008    CHLORIDE 103 01/05/2007    CO2 22 02/25/2008    CO2 24 01/05/2007    BUN 7 02/25/2008    BUN 11 01/05/2007    CR 0.72 02/25/2008    CR 0.80 01/05/2007    GLC 99 02/25/2008    GLC 92 01/05/2007     COAGS: No results found for: \"PTT\", \"INR\", \"FIBR\"  POC: No results found for: \"BGM\", \"HCG\", \"HCGS\"  HEPATIC:   Lab Results "   Component Value Date    ALBUMIN 4.1 05/08/2008    PROTTOTAL 7.3 05/08/2008    ALT 20 05/08/2008    AST 22 05/08/2008    ALKPHOS 61 05/08/2008    BILITOTAL 0.3 05/08/2008     OTHER:   Lab Results   Component Value Date    RONY 9.4 02/25/2008    PHOS 2.0 (L) 02/25/2008    TSH 1.70 08/08/2013    T4 1.47 08/08/2013    T3 70 09/21/2012    CRP <5.0 11/02/2011       Anesthesia Plan    ASA Status:  2    NPO Status:  NPO Appropriate    Anesthesia Type: General.     - Airway: ETT   Induction: Intravenous, Propofol.   Maintenance: TIVA.        Consents    Anesthesia Plan(s) and associated risks, benefits, and realistic alternatives discussed. Questions answered and patient/representative(s) expressed understanding.     - Discussed:     - Discussed with:  Patient      - Extended Intubation/Ventilatory Support Discussed: No.      - Patient is DNR/DNI Status: No     Use of blood products discussed: No .     Postoperative Care    Pain management: IV analgesics, Multi-modal analgesia.   PONV prophylaxis: Ondansetron (or other 5HT-3), Dexamethasone or Solumedrol     Comments:    Other Comments: Patient is counseled on the anesthesia plan and relevant anesthesia procedures including all risks and benefits. All patient questions were answered.     Multi Model Analgesia:  -Tylenol 1000 mg po.  -Precedex gtt at 0.2-0.5 mcg/kg/hour  -Ketamine 25 mg prior to incision and 10 mg every hour boluses.   -Decadron 8mg IV after induction.   -Dilaudid titrated prn.              Clinton Rivera MD    I have reviewed the pertinent notes and labs in the chart from the past 30 days and (re)examined the patient.  Any updates or changes from those notes are reflected in this note.

## 2024-01-31 NOTE — DISCHARGE INSTRUCTIONS
Same Day Surgery Discharge Instructions for  Sedation and General Anesthesia     It's not unusual to feel dizzy, light-headed or faint for up to 24 hours after surgery or while taking pain medication.  If you have these symptoms: sit for a few minutes before standing and have someone assist you when you get up to walk or use the bathroom.    You should rest and relax for the next 24 hours. We recommend you make arrangements to have an adult stay with you for at least 24 hours after your discharge.  Avoid hazardous and strenuous activity.    DO NOT DRIVE any vehicle or operate mechanical equipment for 24 hours following the end of your surgery.  Even though you may feel normal, your reactions may be affected by the medication you have received.    Do not drink alcoholic beverages for 24 hours following surgery.     Slowly progress to your regular diet as you feel able. It's not unusual to feel nauseated and/or vomit after receiving anesthesia.  If you develop these symptoms, drink clear liquids (apple juice, ginger ale, broth, 7-up, etc. ) until you feel better.  If your nausea and vomiting persists for 24 hours, please notify your surgeon.      All narcotic pain medications, along with inactivity and anesthesia, can cause constipation. Drinking plenty of liquids and increasing fiber intake will help.    For any questions of a medical nature, call your surgeon.    Do not make important decisions for 24 hours.    If you had general anesthesia, you may have a sore throat for a couple of days related to the breathing tube used during surgery.  You may use Cepacol lozenges to help with this discomfort.  If it worsens or if you develop a fever, contact your surgeon.     If you feel your pain is not well managed with the pain medications prescribed by your surgeon, please contact your surgeon's office to let them know so they can address your concerns.       Today you were given 975 mg of Tylenol at 7AM, The recommended  daily maximum dose is 4000 mg.       Information for Patients Discharging with a Transderm Scopolamine Patch     Dry mouth is a common side effect.  Drowsiness is another common side effect especially when combined with pain medication.  Please avoid activities that require mental alertness such as driving a car or making important legal decisions.  Since Scopolamine can cause temporary dilation of the pupils and blurred vision if it comes in contact with the eyes; be sure to wash your hands thoroughly with soap and water immediately after handling the patch.   When you remove your patch, please stick it to a tissue or paper towel for disposal.    Remove the patch immediately and contact a physician in the unlikely event that you experience symptoms of acute glaucoma (pain and reddening of the eyes, accompanied by dilated pupils).  Remove the patch if you develop any difficulties urinating.  If you cannot urinate after removing your patch, please notify your surgeon.  Remove the patch 24 hours after surgery.

## 2024-01-31 NOTE — BRIEF OP NOTE
[facility]    Brief Operative Note    Pre-operative diagnosis: Incisional hernia, without obstruction or gangrene [K43.2]  Post-operative diagnosis Incisional Hernia, Rectus Diastasis    Procedure: Abdominal wall reconstruction, N/A - Abdomen  open incisional hernia repair, N/A - Abdomen    Surgeon: Surgeon(s) and Role:     * Leonardo Diggs MD - Primary     * Missael Agarwal PA-C - Assisting    Anesthesia: General   Estimated Blood Loss: 10 mL    Drains: Leonidas-Milner  Specimens: * No specimens in log *  Findings:   Multiple hernias along midline of incision with some rectus diastasis separation .  See Operative report for full details.    Complications: None.  Implants:   Implant Name Type Inv. Item Serial No.  Lot No. LRB No. Used Action   MESH SYMBOTEX COMPOSITE STEX 20CM X 15CM [id number] - [id number] Mesh MESH SYMBOTEX COMPOSITE STEX 20CM X 15CM [id number]  COVIDI [id number] N/A 1 Implanted     Missael Agarwal PA-C

## 2024-02-01 LAB — GLUCOSE BLDC GLUCOMTR-MCNC: 85 MG/DL (ref 70–99)

## 2024-02-01 PROCEDURE — 250N000013 HC RX MED GY IP 250 OP 250 PS 637: Performed by: SURGERY

## 2024-02-01 PROCEDURE — 250N000013 HC RX MED GY IP 250 OP 250 PS 637: Performed by: PHYSICIAN ASSISTANT

## 2024-02-01 PROCEDURE — 250N000011 HC RX IP 250 OP 636: Performed by: SURGERY

## 2024-02-01 PROCEDURE — 250N000011 HC RX IP 250 OP 636: Performed by: PHYSICIAN ASSISTANT

## 2024-02-01 PROCEDURE — 82962 GLUCOSE BLOOD TEST: CPT

## 2024-02-01 RX ORDER — BISACODYL 10 MG
10 SUPPOSITORY, RECTAL RECTAL DAILY PRN
Status: DISCONTINUED | OUTPATIENT
Start: 2024-02-01 | End: 2024-02-02

## 2024-02-01 RX ORDER — POLYETHYLENE GLYCOL 3350 17 G/17G
17 POWDER, FOR SOLUTION ORAL 3 TIMES DAILY
Status: DISCONTINUED | OUTPATIENT
Start: 2024-02-01 | End: 2024-02-03 | Stop reason: HOSPADM

## 2024-02-01 RX ORDER — SIMETHICONE 40MG/0.6ML
100 SUSPENSION, DROPS(FINAL DOSAGE FORM)(ML) ORAL 4 TIMES DAILY
Status: DISCONTINUED | OUTPATIENT
Start: 2024-02-01 | End: 2024-02-03 | Stop reason: HOSPADM

## 2024-02-01 RX ADMIN — ACETAMINOPHEN 975 MG: 325 TABLET, FILM COATED ORAL at 13:32

## 2024-02-01 RX ADMIN — KETOROLAC TROMETHAMINE 15 MG: 15 INJECTION, SOLUTION INTRAMUSCULAR; INTRAVENOUS at 01:11

## 2024-02-01 RX ADMIN — POLYETHYLENE GLYCOL 3350 17 G: 17 POWDER, FOR SOLUTION ORAL at 21:22

## 2024-02-01 RX ADMIN — HYDROMORPHONE HYDROCHLORIDE 0.2 MG: 0.2 INJECTION, SOLUTION INTRAMUSCULAR; INTRAVENOUS; SUBCUTANEOUS at 21:29

## 2024-02-01 RX ADMIN — ACETAMINOPHEN 975 MG: 325 TABLET, FILM COATED ORAL at 05:55

## 2024-02-01 RX ADMIN — METHOCARBAMOL 500 MG: 500 TABLET ORAL at 08:22

## 2024-02-01 RX ADMIN — DOXYLAMINE SUCCINATE 25 MG: 25 TABLET ORAL at 23:15

## 2024-02-01 RX ADMIN — NORETHINDRONE ACETATE AND ETHINYL ESTRADIOL 1 TABLET: .03; 1.5 TABLET ORAL at 08:23

## 2024-02-01 RX ADMIN — POLYETHYLENE GLYCOL 3350 17 G: 17 POWDER, FOR SOLUTION ORAL at 16:59

## 2024-02-01 RX ADMIN — METHOCARBAMOL 500 MG: 500 TABLET ORAL at 17:00

## 2024-02-01 RX ADMIN — ONDANSETRON 4 MG: 2 INJECTION INTRAMUSCULAR; INTRAVENOUS at 18:19

## 2024-02-01 RX ADMIN — SIMETHICONE 100 MG: 20 SOLUTION/ DROPS ORAL at 13:32

## 2024-02-01 RX ADMIN — ACETAMINOPHEN 975 MG: 325 TABLET, FILM COATED ORAL at 21:22

## 2024-02-01 RX ADMIN — ONDANSETRON 4 MG: 4 TABLET, ORALLY DISINTEGRATING ORAL at 05:59

## 2024-02-01 RX ADMIN — PROCHLORPERAZINE MALEATE 10 MG: 10 TABLET ORAL at 23:17

## 2024-02-01 RX ADMIN — METHOCARBAMOL 500 MG: 500 TABLET ORAL at 13:32

## 2024-02-01 RX ADMIN — SIMETHICONE 100 MG: 20 SOLUTION/ DROPS ORAL at 10:21

## 2024-02-01 RX ADMIN — OXYCODONE HYDROCHLORIDE 5 MG: 5 TABLET ORAL at 23:52

## 2024-02-01 RX ADMIN — SIMETHICONE 100 MG: 20 SOLUTION/ DROPS ORAL at 18:19

## 2024-02-01 RX ADMIN — LEVOTHYROXINE SODIUM 175 MCG: 175 TABLET ORAL at 10:03

## 2024-02-01 RX ADMIN — POLYETHYLENE GLYCOL 3350 17 G: 17 POWDER, FOR SOLUTION ORAL at 10:21

## 2024-02-01 RX ADMIN — OXYCODONE HYDROCHLORIDE 5 MG: 5 TABLET ORAL at 16:59

## 2024-02-01 RX ADMIN — KETOROLAC TROMETHAMINE 15 MG: 15 INJECTION, SOLUTION INTRAMUSCULAR; INTRAVENOUS at 07:29

## 2024-02-01 RX ADMIN — METHOCARBAMOL 500 MG: 500 TABLET ORAL at 21:21

## 2024-02-01 RX ADMIN — SENNOSIDES AND DOCUSATE SODIUM 1 TABLET: 50; 8.6 TABLET ORAL at 21:22

## 2024-02-01 RX ADMIN — SIMETHICONE 100 MG: 20 SOLUTION/ DROPS ORAL at 21:22

## 2024-02-01 RX ADMIN — SENNOSIDES AND DOCUSATE SODIUM 1 TABLET: 50; 8.6 TABLET ORAL at 08:22

## 2024-02-01 RX ADMIN — OXYCODONE HYDROCHLORIDE 10 MG: 5 TABLET ORAL at 05:55

## 2024-02-01 ASSESSMENT — ACTIVITIES OF DAILY LIVING (ADL)
ADLS_ACUITY_SCORE: 22
ADLS_ACUITY_SCORE: 20
ADLS_ACUITY_SCORE: 20
ADLS_ACUITY_SCORE: 22
ADLS_ACUITY_SCORE: 22
ADLS_ACUITY_SCORE: 20
ADLS_ACUITY_SCORE: 22

## 2024-02-01 NOTE — PROVIDER NOTIFICATION
"MD Notification    Notified Person: MD    Notified Person Name:  Fabiano Grayson    Notification Date/Time: 1/31/24 910pm    Notification Interaction: phone page    Purpose of Notification: Pt requesting to take her doxylamine succinate 25mg tablet, 1 tablet PO PRN for sleep tonight. Pt states \"otherwise I won't be able to sleep\". Please OK.     Orders Received: OK to take doxylamine succinate 25mg, 1 tablet PO at bedtime PRN for sleep    Comments:   "

## 2024-02-01 NOTE — PLAN OF CARE
Goal Outcome Evaluation:      Plan of Care Reviewed With: patient    Overall Patient Progress: no changeOverall Patient Progress: no change     Date & Time: 1/31/24 2196-3661  Surgery/POD#: POD1 for Abdominal wall reconstruction, open incisional hernia repair  Behavior & Aggression: green  Fall Risk: yes  Orientation:Aox4, some anxiety noted at times  ABNL VS/O2:VSS on RA  ABNL Labs: BG 85   Pain Management:Pain managed with scheduled toradol and tylenol, IV dilaudid x1, oxy x2 and ice.  Bowel/Bladder: No BM this shift, BS hypo, denies passing flatus.   Drains: R MAYCO, stripped q 4hr.  Diet: advanced to Low Fiber diet  Activity Level: SBAIVpole  Tests/Procedures: N/A  Anticipated  DC Date: Pending  Significant Information: Pt reports she takes a lot of PRNs at home for constipation and does not feel that senna alone will help her BF return, pt eager to restart other home bowel regimen meds. Zofran given x2 with relief.

## 2024-02-01 NOTE — PLAN OF CARE
Procedure/POD: POD 0 of  Incisional Hernia, Rectus Diastasis repair  Neuro/Psych/Sleep: A&Ox4, anxious   Pain/Abnormal VS/Tele: VSS RA, pain managed with scheduled and PRN pain meds  Resp/CV: WNL  Skin/Wound: Midline incision with small amount drainage, MAYCO drain   /GI/Diet: Tolerating clears, advance as tolerated. Voiding in BR  IV/Chemo/Radiation: PIV x1. Infusing LR at 100. IV in R arm bruised. Pt did not report any pain but then was pink and swollen upon inspection later in shift. R IV removed.   Abnormal Labs/Glucose: NA  Activity/Safety: A1 gb   Consults: Surgery

## 2024-02-01 NOTE — PROGRESS NOTES
"General Surgery Progress Note    Admission Date: 1/31/2024  Today's Date: 2/1/2024         Assessment:      Gaby Rivas is a 49 year old female   S/P Abdominal Wall Reconstruction and Incisional Hernia Repair with mesh  POD #1     Diagnosis: Rectus Diastasis, Incisional Hernia          Plan:   Pt may use own supply of Linzess once verified by Pharmacy.  Add Miralax to Senokot.  Will make suppository available as well.  Work on mobility today and pain control.  Drain care instructions.    Pain:  Use Robaxin and Tylenol scheduled.  Dilaudid or oxycodone PRN.  Bowel: See above.  Add Simethicone as well.  Antibiotics: None  Diet: Low fiber ok.  Discussed pushing fluids more than solids until ROBF  IVFs: decrease rate.  If good PO, may SL later  DVT prophylaxis: PCDs and Lovenox  Dispo: likely home tomorrow am if better pain control and ROBF        Interval History:   Doing ok when still.  Pain intense with activity.  Needing help getting out of bed.  Denies nausea but concerned about going off her bowel regimen as she uses several agents for constipation on a regular basis.            Physical Exam:   /56 (BP Location: Right arm)   Pulse 65   Temp 98.4  F (36.9  C) (Oral)   Resp 18   Ht 1.651 m (5' 5\")   Wt 71.5 kg (157 lb 11.2 oz)   SpO2 95%   BMI 26.24 kg/m    I/O last 3 completed shifts:  In: 3463 [P.O.:265; I.V.:3198]  Out: 55 [Drains:55]  General: NAD, pleasant, alert and oriented x3  Abdomen: soft, tender, non distended, binder on  Incision: clean, dry, and intact  MAYCO: serosang, 55mL out since surgery    LABS:  Results for orders placed or performed during the hospital encounter of 01/31/24 (from the past 24 hour(s))   Glucose by meter   Result Value Ref Range    GLUCOSE BY METER POCT 85 70 - 99 mg/dL           Missael Agarwal PA-C  Pager: [phone]  [facility]: [phone]      "

## 2024-02-02 LAB — GLUCOSE BLDC GLUCOMTR-MCNC: 125 MG/DL (ref 70–99)

## 2024-02-02 PROCEDURE — 250N000011 HC RX IP 250 OP 636: Performed by: SURGERY

## 2024-02-02 PROCEDURE — 250N000013 HC RX MED GY IP 250 OP 250 PS 637: Performed by: SURGERY

## 2024-02-02 PROCEDURE — 96372 THER/PROPH/DIAG INJ SC/IM: CPT | Performed by: PHYSICIAN ASSISTANT

## 2024-02-02 PROCEDURE — 82962 GLUCOSE BLOOD TEST: CPT

## 2024-02-02 PROCEDURE — 250N000011 HC RX IP 250 OP 636: Performed by: PHYSICIAN ASSISTANT

## 2024-02-02 PROCEDURE — 250N000013 HC RX MED GY IP 250 OP 250 PS 637: Performed by: PHYSICIAN ASSISTANT

## 2024-02-02 RX ORDER — BISACODYL 10 MG
10 SUPPOSITORY, RECTAL RECTAL DAILY
Status: DISCONTINUED | OUTPATIENT
Start: 2024-02-02 | End: 2024-02-03 | Stop reason: HOSPADM

## 2024-02-02 RX ORDER — ENOXAPARIN SODIUM 100 MG/ML
40 INJECTION SUBCUTANEOUS EVERY 24 HOURS
Status: DISCONTINUED | OUTPATIENT
Start: 2024-02-02 | End: 2024-02-03 | Stop reason: HOSPADM

## 2024-02-02 RX ADMIN — METHOCARBAMOL 500 MG: 500 TABLET ORAL at 21:27

## 2024-02-02 RX ADMIN — OXYCODONE HYDROCHLORIDE 5 MG: 5 TABLET ORAL at 11:01

## 2024-02-02 RX ADMIN — NORETHINDRONE ACETATE AND ETHINYL ESTRADIOL 1 TABLET: .03; 1.5 TABLET ORAL at 10:24

## 2024-02-02 RX ADMIN — ENOXAPARIN SODIUM 40 MG: 40 INJECTION SUBCUTANEOUS at 10:21

## 2024-02-02 RX ADMIN — OXYCODONE HYDROCHLORIDE 5 MG: 5 TABLET ORAL at 04:54

## 2024-02-02 RX ADMIN — METHOCARBAMOL 500 MG: 500 TABLET ORAL at 13:45

## 2024-02-02 RX ADMIN — DOXYLAMINE SUCCINATE 25 MG: 25 TABLET ORAL at 23:28

## 2024-02-02 RX ADMIN — SIMETHICONE 100 MG: 20 SOLUTION/ DROPS ORAL at 10:20

## 2024-02-02 RX ADMIN — BISACODYL 10 MG: 10 SUPPOSITORY RECTAL at 10:21

## 2024-02-02 RX ADMIN — ONDANSETRON 4 MG: 2 INJECTION INTRAMUSCULAR; INTRAVENOUS at 23:27

## 2024-02-02 RX ADMIN — ACETAMINOPHEN 975 MG: 325 TABLET, FILM COATED ORAL at 21:27

## 2024-02-02 RX ADMIN — POLYETHYLENE GLYCOL 3350 17 G: 17 POWDER, FOR SOLUTION ORAL at 21:27

## 2024-02-02 RX ADMIN — LEVOTHYROXINE SODIUM 175 MCG: 175 TABLET ORAL at 05:31

## 2024-02-02 RX ADMIN — ONDANSETRON 4 MG: 4 TABLET, ORALLY DISINTEGRATING ORAL at 04:54

## 2024-02-02 RX ADMIN — ACETAMINOPHEN 975 MG: 325 TABLET, FILM COATED ORAL at 05:31

## 2024-02-02 RX ADMIN — POLYETHYLENE GLYCOL 3350 17 G: 17 POWDER, FOR SOLUTION ORAL at 18:07

## 2024-02-02 RX ADMIN — SIMETHICONE 100 MG: 20 SOLUTION/ DROPS ORAL at 21:28

## 2024-02-02 RX ADMIN — ONDANSETRON 4 MG: 4 TABLET, ORALLY DISINTEGRATING ORAL at 11:01

## 2024-02-02 RX ADMIN — SENNOSIDES AND DOCUSATE SODIUM 1 TABLET: 50; 8.6 TABLET ORAL at 10:20

## 2024-02-02 RX ADMIN — SENNOSIDES AND DOCUSATE SODIUM 1 TABLET: 50; 8.6 TABLET ORAL at 21:28

## 2024-02-02 RX ADMIN — METHOCARBAMOL 500 MG: 500 TABLET ORAL at 18:06

## 2024-02-02 RX ADMIN — SUMATRIPTAN SUCCINATE 100 MG: 50 TABLET ORAL at 05:32

## 2024-02-02 RX ADMIN — POLYETHYLENE GLYCOL 3350 17 G: 17 POWDER, FOR SOLUTION ORAL at 10:20

## 2024-02-02 RX ADMIN — METHOCARBAMOL 500 MG: 500 TABLET ORAL at 10:20

## 2024-02-02 RX ADMIN — SIMETHICONE 100 MG: 20 SOLUTION/ DROPS ORAL at 18:06

## 2024-02-02 RX ADMIN — SIMETHICONE 100 MG: 20 SOLUTION/ DROPS ORAL at 13:44

## 2024-02-02 RX ADMIN — ACETAMINOPHEN 975 MG: 325 TABLET, FILM COATED ORAL at 13:45

## 2024-02-02 RX ADMIN — OXYCODONE HYDROCHLORIDE 5 MG: 5 TABLET ORAL at 23:28

## 2024-02-02 ASSESSMENT — ACTIVITIES OF DAILY LIVING (ADL)
ADLS_ACUITY_SCORE: 20

## 2024-02-02 NOTE — PLAN OF CARE
Goal Outcome Evaluation:  Diagnosis: POD 2 for Abdominal wall reconstruction, open incisional hernia repair  Date & Time: 2/1/24 9235-8772  Orientation: A&Ox4, Anxious at times  Activity Level: ind/sba  Fall Risk: yes  Behavior & Aggression: green  Pain Management: Oxycodone and scheduled tylenol for pain. Imitrex for headache  ABNL VS/O2: VSS on RA  Tele: N/A  ABNL Lab/BG:   Diet: Low fiber  Bowel/Bladder: Continent, BS hypo, denies passing flatus, encouraged ambulation  Drains/Devices: PIV SL, MAYCO  q4 stripped  Skin: midline incision-dressing dried drainage, Abdominal binder in place  Tests/Procedures: none  Anticipated DC Date: pending 2/2/24  Other Important Info: compazine given before oxy for nausea, encouraged pt to eat a snack when taking oxy,

## 2024-02-02 NOTE — UTILIZATION REVIEW
Admission Status; Secondary Review Determination       Under the authority of the Utilization Management Committee, the utilization review process indicated a secondary review on the above patient. The review outcome is based on review of the medical records, discussions with staff, and applying clinical experience noted on the date of the review.     (x) Outpatient Status with extended recovery is appropriate - This patient does not meet hospital inpatient criteria. If this patient's primary payer is Medicare and was admitted as an inpatient, Condition Code 44 should be used and patient status changed to outpatient recovery.    RATIONALE FOR DETERMINATION    Patient was admitted to the hospital after the procedure. Patient has Medicare and the procedure is not on the CMS inpatient list. No documented complications or unexpected recovery. Patient can be safely  monitored for bleeding and recover in outpatient/extended recovery setting.   The severity of illness, intensity of service provided, expected LOS and risk for adverse outcome doesn't meet inpatient hospital admission.     The patient is a 49-year-old female admitted on 1/31/2024.  Patient had a incisional hernia and received a abdominal wall reconstruction with open incisional hernia repair.  There were no apparent complications intraoperatively.  Surgery was done by Dr. Leonardo Diggs.  Patient has had post op pain that has been difficult to control along with constipation.  Exercise has been recommended and the patient has tried suppositories this morning and daily.  Patient apparently was improved this morning with some intense twinges of pain.  Based on current condition along with stable labs and no other medical complications, recommend continuation of outpatient status.  If the patient is not discharged tomorrow, would recommend reevaluation with possible need for change to inpatient status given persisting postop problems.    The information on  this document is developed by the utilization review team in order for the business office to ensure compliance. This only denotes the appropriateness of proper admission status and does not reflect the quality of care rendered.   The definitions of Inpatient Status and Observation Status used in making the determination above are those provided in the CMS Coverage Manual, Chapter 1 and Chapter 6, section 70.4.     Sincerely,   Jose A Del Castillo MD  Physician Advisor  Utilization Review/ Case Management  [facility].

## 2024-02-02 NOTE — PROGRESS NOTES
"General Surgery Progress Note    Admission Date: 1/31/2024  Today's Date: 2/1/2024         Assessment:      Gaby Rivas is a 49 year old female   S/P Abdominal Wall Reconstruction and Incisional Hernia Repair with mesh  POD #1     Diagnosis: Rectus Diastasis, Incisional Hernia, Constipation          Plan:   Try suppository this morning and daily.  Continue pain med regimen with trying to wean oxycodone above other meds to help with chronic constipation on top of opioid induced constipation.  Encourage OOB, up in to chair, ambulate.    Drain cares if not already done.    DVT prophylaxis: PCDs and Lovenox  Dispo: home today vs tomorrow pending +BM  If drain output down today, could remove drain tomorrow if in hospital prior to discharge.  Otherwise, will likely be ready for removal early next week.        Interval History:   Feeling some improvement.  Still with intense twinges of pain.  Feels bloated.  Not passing flatus or stool.  Minimal nausea thankfully and better with taking oxycodone with snack.          Physical Exam:   BP (!) 144/84 (BP Location: Right arm)   Pulse 69   Temp 98.3  F (36.8  C) (Oral)   Resp 18   Ht 1.651 m (5' 5\")   Wt 71.5 kg (157 lb 11.2 oz)   SpO2 94%   BMI 26.24 kg/m    I/O last 3 completed shifts:  In: 1750 [P.O.:1050; I.V.:700]  Out: 30 [Drains:30]  General: NAD, pleasant, alert and oriented x3  Abdomen: soft, tender, non distended, binder on  Incision: clean, dry, and intact  MAYCO: serosang, 30mL out yesterday    LABS:  Results for orders placed or performed during the hospital encounter of 01/31/24 (from the past 24 hour(s))   Glucose by meter   Result Value Ref Range    GLUCOSE BY METER POCT 125 (H) 70 - 99 mg/dL           Missael Agarwal PA-C  Pager: [phone]  [facility]: [phone]      "

## 2024-02-02 NOTE — PLAN OF CARE
Pt A&Ox4; VSS; on RA. BS hypoactive; passing flatus. Had dulcolax suppository given with small result. C/o abdominal pain/cramping; ambulation encouraged; simethicone, oxycodone given. Tolerating PO. Prophylactic Zofran given per pt request prior to oxycodone. No c/o nausea/vomiting. Abdominal dressing with scant dried drainage. MAYCO patent with minimal output. Up independently in room/ ambulated in the meng with SBA. Voiding. Plan for possible discharge tomorrow. Will continue to monitor.

## 2024-02-02 NOTE — PROGRESS NOTES
VSS. A/O. SBA. Abdo incision CDI. JPx1. Tolerating diet. Voiding. Oxy/tylenol/dilaudid given. -bs/gas. Zofran/compazine given.

## 2024-02-03 VITALS
RESPIRATION RATE: 16 BRPM | TEMPERATURE: 98 F | WEIGHT: 157.7 LBS | HEART RATE: 92 BPM | DIASTOLIC BLOOD PRESSURE: 78 MMHG | BODY MASS INDEX: 26.27 KG/M2 | OXYGEN SATURATION: 98 % | SYSTOLIC BLOOD PRESSURE: 117 MMHG | HEIGHT: 65 IN

## 2024-02-03 PROCEDURE — 250N000013 HC RX MED GY IP 250 OP 250 PS 637: Performed by: PHYSICIAN ASSISTANT

## 2024-02-03 PROCEDURE — 250N000011 HC RX IP 250 OP 636: Performed by: SURGERY

## 2024-02-03 PROCEDURE — 250N000011 HC RX IP 250 OP 636: Performed by: PHYSICIAN ASSISTANT

## 2024-02-03 PROCEDURE — 96372 THER/PROPH/DIAG INJ SC/IM: CPT | Performed by: PHYSICIAN ASSISTANT

## 2024-02-03 RX ORDER — OXYCODONE HYDROCHLORIDE 5 MG/1
5 TABLET ORAL EVERY 4 HOURS PRN
Qty: 12 TABLET | Refills: 0 | Status: SHIPPED | OUTPATIENT
Start: 2024-02-03 | End: 2024-05-09

## 2024-02-03 RX ORDER — AMOXICILLIN 250 MG
1 CAPSULE ORAL 2 TIMES DAILY
Qty: 30 TABLET | Refills: 0 | Status: SHIPPED | OUTPATIENT
Start: 2024-02-03

## 2024-02-03 RX ORDER — METHOCARBAMOL 500 MG/1
500 TABLET, FILM COATED ORAL 4 TIMES DAILY
Qty: 28 TABLET | Refills: 0 | Status: SHIPPED | OUTPATIENT
Start: 2024-02-03 | End: 2024-02-15

## 2024-02-03 RX ORDER — ONDANSETRON 4 MG/1
4 TABLET, ORALLY DISINTEGRATING ORAL EVERY 6 HOURS PRN
Qty: 20 TABLET | Refills: 0 | Status: SHIPPED | OUTPATIENT
Start: 2024-02-03

## 2024-02-03 RX ORDER — SIMETHICONE 40MG/0.6ML
100 SUSPENSION, DROPS(FINAL DOSAGE FORM)(ML) ORAL 4 TIMES DAILY
Qty: 30 ML | Refills: 0 | Status: SHIPPED | OUTPATIENT
Start: 2024-02-03

## 2024-02-03 RX ORDER — BISACODYL 10 MG
10 SUPPOSITORY, RECTAL RECTAL DAILY PRN
Qty: 10 SUPPOSITORY | Refills: 0 | Status: SHIPPED | OUTPATIENT
Start: 2024-02-03 | End: 2024-05-09

## 2024-02-03 RX ORDER — ACETAMINOPHEN 325 MG/1
975 TABLET ORAL EVERY 8 HOURS
Qty: 90 TABLET | Refills: 0 | Status: SHIPPED | OUTPATIENT
Start: 2024-02-03

## 2024-02-03 RX ADMIN — SIMETHICONE 100 MG: 20 SOLUTION/ DROPS ORAL at 14:38

## 2024-02-03 RX ADMIN — POLYETHYLENE GLYCOL 3350 17 G: 17 POWDER, FOR SOLUTION ORAL at 16:53

## 2024-02-03 RX ADMIN — POLYETHYLENE GLYCOL 3350 17 G: 17 POWDER, FOR SOLUTION ORAL at 08:23

## 2024-02-03 RX ADMIN — SENNOSIDES AND DOCUSATE SODIUM 1 TABLET: 50; 8.6 TABLET ORAL at 08:24

## 2024-02-03 RX ADMIN — ONDANSETRON 4 MG: 4 TABLET, ORALLY DISINTEGRATING ORAL at 05:31

## 2024-02-03 RX ADMIN — SIMETHICONE 100 MG: 20 SOLUTION/ DROPS ORAL at 08:24

## 2024-02-03 RX ADMIN — METHOCARBAMOL 500 MG: 500 TABLET ORAL at 11:27

## 2024-02-03 RX ADMIN — LEVOTHYROXINE SODIUM 175 MCG: 175 TABLET ORAL at 06:27

## 2024-02-03 RX ADMIN — METHOCARBAMOL 500 MG: 500 TABLET ORAL at 08:24

## 2024-02-03 RX ADMIN — ENOXAPARIN SODIUM 40 MG: 40 INJECTION SUBCUTANEOUS at 11:28

## 2024-02-03 RX ADMIN — ACETAMINOPHEN 975 MG: 325 TABLET, FILM COATED ORAL at 05:31

## 2024-02-03 RX ADMIN — METHOCARBAMOL 500 MG: 500 TABLET ORAL at 16:53

## 2024-02-03 RX ADMIN — BISACODYL 10 MG: 10 SUPPOSITORY RECTAL at 08:27

## 2024-02-03 RX ADMIN — OXYCODONE HYDROCHLORIDE 5 MG: 5 TABLET ORAL at 05:33

## 2024-02-03 RX ADMIN — ACETAMINOPHEN 975 MG: 325 TABLET, FILM COATED ORAL at 14:38

## 2024-02-03 RX ADMIN — NORETHINDRONE ACETATE AND ETHINYL ESTRADIOL 1 TABLET: .03; 1.5 TABLET ORAL at 07:22

## 2024-02-03 ASSESSMENT — ACTIVITIES OF DAILY LIVING (ADL)
ADLS_ACUITY_SCORE: 20

## 2024-02-03 NOTE — DISCHARGE SUMMARY
[facility] Discharge Summary    Gaby Rivas MRN# [id number]   Age: 49 year old YOB: 1975     Date of Admission:  1/31/2024  Date of Discharge::  2/3/2024  Admitting Physician:  Leonardo Diggs MD  Discharge Physician:  Priya Chow MD             Admission Diagnoses:   Incisional hernia, without obstruction or gangrene [K43.2]            Discharge Diagnosis:     Incisional hernia, without obstruction or gangrene [K43.2]          Procedures:     Procedure(s): 1/31/2024: Open abdominal wall reconstruction, retrorectus incisional hernia repair with mesh.  Surgeon: Leonardo Diggs MD        No other procedures performed during this admission           Medications Prior to Admission:     Medications Prior to Admission   Medication Sig Dispense Refill Last Dose    doxylamine (UNISOM) 25 MG TABS tablet Take 25 mg by mouth at bedtime   1/30/2024 at pm    levothyroxine (SYNTHROID/LEVOTHROID) 175 MCG tablet Take 175 mcg by mouth daily   1/31/2024 at 0430    linaclotide (LINZESS) 145 MCG capsule Take 145 mcg by mouth every morning (before breakfast)   1/30/2024 at am    mometasone (ELOCON) 0.1 % external ointment Apply topically 2 times daily as needed   Unknown at prn    norethindrone-ethinyl estradiol (MICROGESTIN 1.5/30) 1.5-30 MG-MCG tablet Take 1 tablet by mouth daily   1/31/2024 at 0430    ondansetron (ZOFRAN) 4 MG tablet Take 4 mg by mouth every 8 hours as needed for nausea   Unknown at prn    SUMAtriptan (IMITREX) 100 MG tablet Take 100 mg by mouth at onset of headache for migraine   1/17/2024 at prn             Discharge Medications:     Current Discharge Medication List        START taking these medications    Details   acetaminophen (TYLENOL) 325 MG tablet Take 3 tablets (975 mg) by mouth every 8 hours  Qty: 90 tablet, Refills: 0    Associated Diagnoses: Incisional hernia, without obstruction or gangrene      bisacodyl (DULCOLAX) 10 MG suppository Place 1 suppository (10 mg)  rectally daily as needed for constipation  Qty: 10 suppository, Refills: 0    Associated Diagnoses: Incisional hernia, without obstruction or gangrene      methocarbamol (ROBAXIN) 500 MG tablet Take 1 tablet (500 mg) by mouth 4 times daily  Qty: 28 tablet, Refills: 0    Associated Diagnoses: Incisional hernia, without obstruction or gangrene      ondansetron (ZOFRAN ODT) 4 MG ODT tab Take 1 tablet (4 mg) by mouth every 6 hours as needed for nausea or vomiting  Qty: 20 tablet, Refills: 0    Associated Diagnoses: Incisional hernia, without obstruction or gangrene      oxyCODONE (ROXICODONE) 5 MG tablet Take 1 tablet (5 mg) by mouth every 4 hours as needed for moderate pain  Qty: 12 tablet, Refills: 0    Associated Diagnoses: Incisional hernia, without obstruction or gangrene      senna-docusate (SENOKOT-S/PERICOLACE) 8.6-50 MG tablet Take 1 tablet by mouth 2 times daily  Qty: 30 tablet, Refills: 0    Associated Diagnoses: Incisional hernia, without obstruction or gangrene      simethicone (MYLICON) 40 MG/0.6ML suspension Take 1.5 mLs (100 mg) by mouth 4 times daily  Qty: 30 mL, Refills: 0    Associated Diagnoses: Incisional hernia, without obstruction or gangrene           CONTINUE these medications which have NOT CHANGED    Details   doxylamine (UNISOM) 25 MG TABS tablet Take 25 mg by mouth at bedtime      levothyroxine (SYNTHROID/LEVOTHROID) 175 MCG tablet Take 175 mcg by mouth daily      linaclotide (LINZESS) 145 MCG capsule Take 145 mcg by mouth every morning (before breakfast)      mometasone (ELOCON) 0.1 % external ointment Apply topically 2 times daily as needed      norethindrone-ethinyl estradiol (MICROGESTIN 1.5/30) 1.5-30 MG-MCG tablet Take 1 tablet by mouth daily      ondansetron (ZOFRAN) 4 MG tablet Take 4 mg by mouth every 8 hours as needed for nausea      SUMAtriptan (IMITREX) 100 MG tablet Take 100 mg by mouth at onset of headache for migraine                   Consultations:   No consultations were  requested during this admission          Brief History of Illness:   Reason for admission requiring a surgical or invasive procedure:   Incisional hernia status post 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   The patient underwent the procedure listed above  There were no immediate complications during this procedure.    Please refer to the full operative summary for details.             Hospital Course:   The patient's hospital course was unremarkable.  She recovered as anticipated and experienced no post-operative complications.  On the day of discharge, patient had several small bowel movements induced by suppository and 1 independent bowel movement.  She reports tolerating a regular diet, voiding, ambulating and managing her pain with only oral medications. Drain output was minimal, serous..           Discharge Instructions and Follow-Up:     Discharge diet: Regular   Discharge activity: Lifting restricted to 10 pounds for 4 to 6 weeks.   Discharge follow-up: Follow-up with Dr. Diggs in 2 weeks (patient understands she needs to call clinic this upcoming week).   Wound care: Please drain dressing as needed if saturated until it closes.  Okay to shower; let warm soapy water run over incision.            Discharge Disposition:     Discharged to home        Priya Chow MD

## 2024-02-03 NOTE — PROGRESS NOTES
Pt is A&Ox4. VSS on RA. Tolerating low fiber diet. Up independently in the room.  Supository was administered, had BM. MAYCO removed.mild pain, decreased scheduled robaxin and tylenol. Discharged to home with her son. Questions/concerns were addressed.

## 2024-02-03 NOTE — PLAN OF CARE
Goal Outcome Evaluation:      Plan of Care Reviewed With: patient    Overall Patient Progress: improvingOverall Patient Progress: improving    Shift: 7p-7a  Surgery/POD#: POD 3 from an abd wall reconstruction & incisional hernia repair w/ mesh.  Behavior & Aggression: Green  Fall Risk: No  Orientation: A&O x4  ABNL VS/O2: VSS on RA  Tele: NA  ABNL Labs: NA  Pain Management: tyl, robaxin, simethicone, oxy  Bowel/Bladder: voiding in BR, bowel sounds hypoactive, passing some flatus, no BM this shift  Drains: RLQ MAYCO to bulb suction, very minimal output, stripping q4 hours  Lines: PIV SL'd  Skin: mid abd dressing w/ dried drainage (no change)  Diet: low fiber, denies nausea - zofran given x2 prophylactically w/ oxy  Activity Level: Ind/SBA, ambulated once before bed  Tests/Procedures: NA  Anticipated  DC Date: 2/3 pending BM  Significant Information: MAYCO drain will possibly be removed before discharge.

## 2024-02-15 ENCOUNTER — OFFICE VISIT (OUTPATIENT)
Dept: SURGERY | Facility: CLINIC | Age: 49
End: 2024-02-15
Payer: COMMERCIAL

## 2024-02-15 DIAGNOSIS — K43.2 INCISIONAL HERNIA, WITHOUT OBSTRUCTION OR GANGRENE: ICD-10-CM

## 2024-02-15 DIAGNOSIS — Z09 SURGERY FOLLOW-UP EXAMINATION: Primary | ICD-10-CM

## 2024-02-15 PROCEDURE — 99024 POSTOP FOLLOW-UP VISIT: CPT | Performed by: SURGERY

## 2024-02-15 RX ORDER — METHOCARBAMOL 500 MG/1
500 TABLET, FILM COATED ORAL 4 TIMES DAILY
Qty: 28 TABLET | Refills: 0 | Status: SHIPPED | OUTPATIENT
Start: 2024-02-15 | End: 2024-02-15

## 2024-02-15 RX ORDER — POLYETHYLENE GLYCOL 3350 17 G/17G
1 POWDER, FOR SOLUTION ORAL DAILY
COMMUNITY

## 2024-02-15 NOTE — LETTER
March 7, 2024          Robin Mckinley      RE:   Gaby Rivas 1975      Dear Colleague,    Thank you for referring your patient, Gaby Rivas, to [facility]. Please see a copy of my visit note below.    Patient returns in follow-up after recent abdominal wall reconstruction.  She continues to have a moderate amount of discomfort but is off of her prescription narcotics.  She has had no fevers or chills.  She is tolerated drain removal.  She is slowly increasing her level of activity but is still quite limited.  Primary concern is of abdominal wall contour.     On examination: There is no erythema or cellulitis.  Upper midline incision is healing well without evidence of infection.  There is some protuberance of the central abdomen likely related to the diastases and hernia repair.     Overall she seems to be progressing reasonably well.  I encouraged her to not  the abdominal wall contour until at least 6 to 12 months postop.  We discussed ongoing activity restrictions.  Her questions were answered.  She will follow-up again in 1 month.       Again, thank you for allowing me to participate in the care of your patient.      Sincerely,      Leonardo Diggs MD

## 2024-02-29 ENCOUNTER — TELEPHONE (OUTPATIENT)
Dept: SURGERY | Facility: CLINIC | Age: 49
End: 2024-02-29
Payer: COMMERCIAL

## 2024-02-29 NOTE — TELEPHONE ENCOUNTER
Call received from pre-op RN by charge RN; Dr. Barlow has cancelled procedure this evening due to scheduling issues. Pt aware, concerns addressed, Samantha coordinator at bedside to talk with patient and family. Dr. Birmingham notified of change of POC- procedure scheduled for tomorrow cris. Pt instructed to notify RN with any and all needs and agrees.    Name of caller: Patient    Reason for Call:  patient wondering how much she is able to walk? Is a longer walk ok?  Still experiencing some discomfort and does not want to over do it. Please call to discuss.    Surgeon:  Leonardo Diggs MD    Recent Surgery:  Yes.    If yes, when & what type:  1/31/24    Abdominal wall reconstruction   open incisional hernia repair     Best phone number to reach pt at is: [phone]    Ok to leave a message with medical info? Yes.

## 2024-02-29 NOTE — TELEPHONE ENCOUNTER
"Procedure: abdominal wall reconstruction, open retrorectus incisional hernia repair    Date: 01/31/2024    Surgeon: Dontae    Patient wondering how much walking is allowed? She reports that Dr. Diggs wants her on restrictions for 8 weeks (vs the 6 weeks the discharge instructions indicates) and that he told her he didn't want her doing much. Does this include walking?    Informed her walking is encouraged. But she should avoid overly strenuous, \"exercise walking\" until she is cleared by Dr Diggs, hopefully at her next visit in two weeks, especially if he indicated to her that he did not want her doing too much, activity wise.    She agreed    She reports that he wants her wearing the abdominal binder for 8 weeks. The one she currently has is having issues staying tight due to the velcro. Should she get another one?  Informed her that if he advised that she wear a binder for 8 weeks, she could certainly purchase another abdominal binder, if her current one is not working appropriately. She is wondering if we have another one for her? Informed her that those are given to patients when they are inpatient and that we do not have them here in clinic, but she should be able to find them online or at [facility]. She will likely need to take measurements to make sure she gets the appropriate size.  She agreed.    All questions answered. She will call BIPIN Joyce, RN-BSN        "

## 2024-03-07 RX ORDER — METHOCARBAMOL 500 MG/1
500 TABLET, FILM COATED ORAL 4 TIMES DAILY
Qty: 28 TABLET | Refills: 0 | Status: SHIPPED | OUTPATIENT
Start: 2024-03-07 | End: 2024-05-09

## 2024-03-07 NOTE — PROGRESS NOTES
Patient returns in follow-up after recent abdominal wall reconstruction.  She continues to have a moderate amount of discomfort but is off of her prescription narcotics.  She has had no fevers or chills.  She is tolerated drain removal.  She is slowly increasing her level of activity but is still quite limited.  Primary concern is of abdominal wall contour.    On examination: There is no erythema or cellulitis.  Upper midline incision is healing well without evidence of infection.  There is some protuberance of the central abdomen likely related to the diastases and hernia repair.    Overall she seems to be progressing reasonably well.  I encouraged her to not  the abdominal wall contour until at least 6 to 12 months postop.  We discussed ongoing activity restrictions.  Her questions were answered.  She will follow-up again in 1 month.

## 2024-03-14 ENCOUNTER — OFFICE VISIT (OUTPATIENT)
Dept: SURGERY | Facility: CLINIC | Age: 49
End: 2024-03-14
Payer: COMMERCIAL

## 2024-03-14 DIAGNOSIS — Z09 SURGERY FOLLOW-UP EXAMINATION: Primary | ICD-10-CM

## 2024-03-14 PROCEDURE — 99024 POSTOP FOLLOW-UP VISIT: CPT | Performed by: SURGERY

## 2024-03-14 RX ORDER — IBUPROFEN 600 MG/1
600 TABLET, FILM COATED ORAL
COMMUNITY
Start: 2022-12-14

## 2024-03-14 NOTE — LETTER
March 21, 2024          Robin Mckinley MD  [facility]  [place] [address]      RE:   Gaby Rivas 1975      Dear Colleague,    Thank you for referring your patient, Gaby Rivas, to [facility] at [facility]. Please see a copy of my visit note below.    Patient returns in ongoing follow-up after abdominal wall reconstruction.  Continues to have some abdominal wall discomfort using muscle relaxant still on a daily basis.  Does seem to be becoming more mobile.  No fevers or chills.  Has ongoing and chronic struggles with constipation and obstipation.     On examination: Incision is well-healed.  There is no evidence of infection.  There is good approximation of the abdominal wall muscles.  No evidence of recurrence of hernia.     Overall progressing albeit somewhat slowly.  Discussed ongoing activity.  Needs to start rehabbing her abdominal wall and core muscles.  Overall though I think her progress is on track.  Her questions were answered.  Will follow-up again in approximately 1 month.       Again, thank you for allowing me to participate in the care of your patient.      Sincerely,      Leonardo Diggs MD

## 2024-03-21 NOTE — PROGRESS NOTES
Patient returns in ongoing follow-up after abdominal wall reconstruction.  Continues to have some abdominal wall discomfort using muscle relaxant still on a daily basis.  Does seem to be becoming more mobile.  No fevers or chills.  Has ongoing and chronic struggles with constipation and obstipation.    On examination: Incision is well-healed.  There is no evidence of infection.  There is good approximation of the abdominal wall muscles.  No evidence of recurrence of hernia.    Overall progressing albeit somewhat slowly.  Discussed ongoing activity.  Needs to start rehabbing her abdominal wall and core muscles.  Overall though I think her progress is on track.  Her questions were answered.  Will follow-up again in approximately 1 month.

## 2024-05-09 ENCOUNTER — OFFICE VISIT (OUTPATIENT)
Dept: SURGERY | Facility: CLINIC | Age: 49
End: 2024-05-09
Payer: COMMERCIAL

## 2024-05-09 VITALS
BODY MASS INDEX: 24.11 KG/M2 | HEIGHT: 66 IN | OXYGEN SATURATION: 100 % | SYSTOLIC BLOOD PRESSURE: 136 MMHG | DIASTOLIC BLOOD PRESSURE: 86 MMHG | WEIGHT: 150 LBS | HEART RATE: 86 BPM

## 2024-05-09 DIAGNOSIS — Z09 SURGERY FOLLOW-UP EXAMINATION: Primary | ICD-10-CM

## 2024-05-09 PROCEDURE — 99212 OFFICE O/P EST SF 10 MIN: CPT | Performed by: SURGERY
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
## 2024-05-10 ENCOUNTER — TELEPHONE (OUTPATIENT)
Dept: SURGERY | Facility: CLINIC | Age: 49
End: 2024-05-10
Payer: COMMERCIAL

## 2024-05-10 DIAGNOSIS — Z98.890 HX OF HERNIA REPAIR: ICD-10-CM

## 2024-05-10 DIAGNOSIS — Z87.19 HX OF HERNIA REPAIR: ICD-10-CM

## 2024-05-10 DIAGNOSIS — Z09 S/P ABDOMINAL SURGERY, FOLLOW-UP EXAM: Primary | ICD-10-CM

## 2024-05-10 NOTE — TELEPHONE ENCOUNTER
Patient had an abdominal wall reconstruct 1/24 [facility] and saw him yesterday where he suggested PT and she has some questions about therapy    Please call    Phone: [phone]   Message ok

## 2024-05-13 ENCOUNTER — MYC MEDICAL ADVICE (OUTPATIENT)
Dept: SURGERY | Facility: CLINIC | Age: 49
End: 2024-05-13
Payer: COMMERCIAL

## 2024-12-28 ENCOUNTER — HEALTH MAINTENANCE LETTER (OUTPATIENT)
Age: 49
End: 2024-12-28

## (undated) DEVICE — GLOVE BIOGEL PI SZ 8.0 40880

## (undated) DEVICE — DRSG TELFA ISLAND 4X8" 7541

## (undated) DEVICE — ESU PENCIL W/SMOKE EVAC NEPTUNE STRYKER 0703-046-000

## (undated) DEVICE — Device

## (undated) DEVICE — PREP CHLORAPREP 26ML TINTED HI-LITE ORANGE 930815

## (undated) DEVICE — DRSG STERI STRIP 1/2X4" R1547

## (undated) DEVICE — DRAIN JACKSON PRATT RESERVOIR 100ML SU130-1305

## (undated) DEVICE — NDL 22GA 1.5"

## (undated) DEVICE — SOL WATER IRRIG 1000ML BOTTLE 2F7114

## (undated) DEVICE — PACK MINOR SBA15MIFSE

## (undated) DEVICE — DRAPE BREAST/CHEST 29420

## (undated) DEVICE — GOWN IMPERVIOUS SPECIALTY XLG/XLONG 32474

## (undated) DEVICE — SU SILK 2-0 FSL 18" 677G

## (undated) DEVICE — LINEN TOWEL PACK X5 5464

## (undated) DEVICE — DRAIN JACKSON PRATT 15FR ROUND SU130-1323

## (undated) DEVICE — SU VICRYL 0 CT-1 27" UND J260H

## (undated) DEVICE — SU VICRYL 3-0 SH 27" J316H

## (undated) DEVICE — SU MONOCRYL 4-0 PS-2 18" UND Y496G

## (undated) DEVICE — DRSG GAUZE 4X4" 3033

## (undated) DEVICE — SU VICRYL 2-0 SH 27" J317H

## (undated) DEVICE — SPONGE LAP 18X18" X8435

## (undated) DEVICE — SUCTION TIP YANKAUER W/O VENT K86

## (undated) DEVICE — BNDG ABDOMINAL BINDER 9X30-45" 79-89070

## (undated) DEVICE — ESU GROUND PAD UNIVERSAL W/O CORD

## (undated) DEVICE — SU VICRYL 0 UR-6 27" J603H

## (undated) DEVICE — BLADE KNIFE SURG 10 371110

## (undated) RX ORDER — SCOLOPAMINE TRANSDERMAL SYSTEM 1 MG/1
PATCH, EXTENDED RELEASE TRANSDERMAL
Status: DISPENSED
Start: 2024-01-31

## (undated) RX ORDER — PROPOFOL 10 MG/ML
INJECTION, EMULSION INTRAVENOUS
Status: DISPENSED
Start: 2024-01-31

## (undated) RX ORDER — CEFAZOLIN SODIUM/WATER 2 G/20 ML
SYRINGE (ML) INTRAVENOUS
Status: DISPENSED
Start: 2024-01-31

## (undated) RX ORDER — ONDANSETRON 2 MG/ML
INJECTION INTRAMUSCULAR; INTRAVENOUS
Status: DISPENSED
Start: 2024-01-31

## (undated) RX ORDER — FENTANYL CITRATE 0.05 MG/ML
INJECTION, SOLUTION INTRAMUSCULAR; INTRAVENOUS
Status: DISPENSED
Start: 2024-01-31

## (undated) RX ORDER — FENTANYL CITRATE 50 UG/ML
INJECTION, SOLUTION INTRAMUSCULAR; INTRAVENOUS
Status: DISPENSED
Start: 2024-01-31

## (undated) RX ORDER — GLYCOPYRROLATE 0.2 MG/ML
INJECTION, SOLUTION INTRAMUSCULAR; INTRAVENOUS
Status: DISPENSED
Start: 2024-01-31

## (undated) RX ORDER — DEXAMETHASONE SODIUM PHOSPHATE 4 MG/ML
INJECTION, SOLUTION INTRA-ARTICULAR; INTRALESIONAL; INTRAMUSCULAR; INTRAVENOUS; SOFT TISSUE
Status: DISPENSED
Start: 2024-01-31

## (undated) RX ORDER — DEXMEDETOMIDINE HYDROCHLORIDE 4 UG/ML
INJECTION, SOLUTION INTRAVENOUS
Status: DISPENSED
Start: 2024-01-31

## (undated) RX ORDER — HYDROMORPHONE HYDROCHLORIDE 1 MG/ML
INJECTION, SOLUTION INTRAMUSCULAR; INTRAVENOUS; SUBCUTANEOUS
Status: DISPENSED
Start: 2024-01-31

## (undated) RX ORDER — NEOSTIGMINE METHYLSULFATE 1 MG/ML
VIAL (ML) INJECTION
Status: DISPENSED
Start: 2024-01-31

## (undated) RX ORDER — KETOROLAC TROMETHAMINE 30 MG/ML
INJECTION, SOLUTION INTRAMUSCULAR; INTRAVENOUS
Status: DISPENSED
Start: 2024-01-31

## (undated) RX ORDER — ACETAMINOPHEN 325 MG/1
TABLET ORAL
Status: DISPENSED
Start: 2024-01-31

## (undated) RX ORDER — BUPIVACAINE HYDROCHLORIDE 5 MG/ML
INJECTION, SOLUTION EPIDURAL; INTRACAUDAL
Status: DISPENSED
Start: 2024-01-31